# Patient Record
Sex: MALE | Race: WHITE | HISPANIC OR LATINO | URBAN - METROPOLITAN AREA
[De-identification: names, ages, dates, MRNs, and addresses within clinical notes are randomized per-mention and may not be internally consistent; named-entity substitution may affect disease eponyms.]

---

## 2019-02-09 ENCOUNTER — EMERGENCY (EMERGENCY)
Facility: HOSPITAL | Age: 49
LOS: 0 days | Discharge: HOME | End: 2019-02-09
Attending: EMERGENCY MEDICINE
Payer: MEDICAID

## 2019-02-09 VITALS
OXYGEN SATURATION: 97 % | HEART RATE: 92 BPM | WEIGHT: 307.1 LBS | SYSTOLIC BLOOD PRESSURE: 126 MMHG | DIASTOLIC BLOOD PRESSURE: 65 MMHG | RESPIRATION RATE: 17 BRPM | TEMPERATURE: 98 F | HEIGHT: 66 IN

## 2019-02-09 VITALS
SYSTOLIC BLOOD PRESSURE: 124 MMHG | TEMPERATURE: 98 F | OXYGEN SATURATION: 100 % | RESPIRATION RATE: 18 BRPM | HEART RATE: 80 BPM | DIASTOLIC BLOOD PRESSURE: 78 MMHG

## 2019-02-09 DIAGNOSIS — R07.9 CHEST PAIN, UNSPECIFIED: ICD-10-CM

## 2019-02-09 DIAGNOSIS — Z79.4 LONG TERM (CURRENT) USE OF INSULIN: ICD-10-CM

## 2019-02-09 DIAGNOSIS — R50.9 FEVER, UNSPECIFIED: ICD-10-CM

## 2019-02-09 DIAGNOSIS — J18.9 PNEUMONIA, UNSPECIFIED ORGANISM: ICD-10-CM

## 2019-02-09 DIAGNOSIS — F32.9 MAJOR DEPRESSIVE DISORDER, SINGLE EPISODE, UNSPECIFIED: ICD-10-CM

## 2019-02-09 DIAGNOSIS — J67.7: ICD-10-CM

## 2019-02-09 DIAGNOSIS — I10 ESSENTIAL (PRIMARY) HYPERTENSION: ICD-10-CM

## 2019-02-09 DIAGNOSIS — Z79.82 LONG TERM (CURRENT) USE OF ASPIRIN: ICD-10-CM

## 2019-02-09 DIAGNOSIS — R05 COUGH: ICD-10-CM

## 2019-02-09 DIAGNOSIS — E11.9 TYPE 2 DIABETES MELLITUS WITHOUT COMPLICATIONS: ICD-10-CM

## 2019-02-09 LAB
ALBUMIN SERPL ELPH-MCNC: 2.8 G/DL — LOW (ref 3.3–5)
ALP SERPL-CCNC: 76 U/L — SIGNIFICANT CHANGE UP (ref 40–120)
ALT FLD-CCNC: 15 U/L — SIGNIFICANT CHANGE UP (ref 12–78)
ANION GAP SERPL CALC-SCNC: 8 MMOL/L — SIGNIFICANT CHANGE UP (ref 5–17)
APTT BLD: 33.3 SEC — SIGNIFICANT CHANGE UP (ref 27.5–36.3)
AST SERPL-CCNC: 8 U/L — LOW (ref 15–37)
BILIRUB SERPL-MCNC: 0.5 MG/DL — SIGNIFICANT CHANGE UP (ref 0.2–1.2)
BUN SERPL-MCNC: 11 MG/DL — SIGNIFICANT CHANGE UP (ref 7–23)
CALCIUM SERPL-MCNC: 8.5 MG/DL — SIGNIFICANT CHANGE UP (ref 8.5–10.1)
CHLORIDE SERPL-SCNC: 105 MMOL/L — SIGNIFICANT CHANGE UP (ref 96–108)
CK MB CFR SERPL CALC: 3.2 NG/ML — SIGNIFICANT CHANGE UP (ref 0.5–3.6)
CO2 SERPL-SCNC: 28 MMOL/L — SIGNIFICANT CHANGE UP (ref 22–31)
CREAT SERPL-MCNC: 0.62 MG/DL — SIGNIFICANT CHANGE UP (ref 0.5–1.3)
GLUCOSE SERPL-MCNC: 204 MG/DL — HIGH (ref 70–99)
HCT VFR BLD CALC: 40.4 % — SIGNIFICANT CHANGE UP (ref 39–50)
HGB BLD-MCNC: 13.5 G/DL — SIGNIFICANT CHANGE UP (ref 13–17)
INR BLD: 1.01 RATIO — SIGNIFICANT CHANGE UP (ref 0.88–1.16)
MCHC RBC-ENTMCNC: 27.3 PG — SIGNIFICANT CHANGE UP (ref 27–34)
MCHC RBC-ENTMCNC: 33.4 GM/DL — SIGNIFICANT CHANGE UP (ref 32–36)
MCV RBC AUTO: 81.8 FL — SIGNIFICANT CHANGE UP (ref 80–100)
NRBC # BLD: 0 /100 WBCS — SIGNIFICANT CHANGE UP (ref 0–0)
PLATELET # BLD AUTO: 258 K/UL — SIGNIFICANT CHANGE UP (ref 150–400)
POTASSIUM SERPL-MCNC: 4 MMOL/L — SIGNIFICANT CHANGE UP (ref 3.5–5.3)
POTASSIUM SERPL-SCNC: 4 MMOL/L — SIGNIFICANT CHANGE UP (ref 3.5–5.3)
PROT SERPL-MCNC: 7.7 GM/DL — SIGNIFICANT CHANGE UP (ref 6–8.3)
PROTHROM AB SERPL-ACNC: 11.3 SEC — SIGNIFICANT CHANGE UP (ref 10–12.9)
RBC # BLD: 4.94 M/UL — SIGNIFICANT CHANGE UP (ref 4.2–5.8)
RBC # FLD: 13.5 % — SIGNIFICANT CHANGE UP (ref 10.3–14.5)
SODIUM SERPL-SCNC: 141 MMOL/L — SIGNIFICANT CHANGE UP (ref 135–145)
TROPONIN I SERPL-MCNC: <.015 NG/ML — SIGNIFICANT CHANGE UP (ref 0.01–0.04)
WBC # BLD: 6.16 K/UL — SIGNIFICANT CHANGE UP (ref 3.8–10.5)
WBC # FLD AUTO: 6.16 K/UL — SIGNIFICANT CHANGE UP (ref 3.8–10.5)

## 2019-02-09 PROCEDURE — 71045 X-RAY EXAM CHEST 1 VIEW: CPT | Mod: 26

## 2019-02-09 PROCEDURE — 99285 EMERGENCY DEPT VISIT HI MDM: CPT

## 2019-02-09 RX ORDER — ATORVASTATIN CALCIUM 80 MG/1
1 TABLET, FILM COATED ORAL
Qty: 0 | Refills: 0 | COMMUNITY

## 2019-02-09 RX ORDER — ACETAMINOPHEN 500 MG
975 TABLET ORAL ONCE
Qty: 0 | Refills: 0 | Status: COMPLETED | OUTPATIENT
Start: 2019-02-09 | End: 2019-02-09

## 2019-02-09 RX ORDER — FLUOXETINE HCL 10 MG
1 CAPSULE ORAL
Qty: 0 | Refills: 0 | COMMUNITY

## 2019-02-09 RX ORDER — AZITHROMYCIN 500 MG/1
500 TABLET, FILM COATED ORAL ONCE
Qty: 0 | Refills: 0 | Status: COMPLETED | OUTPATIENT
Start: 2019-02-09 | End: 2019-02-09

## 2019-02-09 RX ORDER — AZITHROMYCIN 500 MG/1
1 TABLET, FILM COATED ORAL
Qty: 4 | Refills: 0
Start: 2019-02-09 | End: 2019-02-12

## 2019-02-09 RX ORDER — SERTRALINE 25 MG/1
1 TABLET, FILM COATED ORAL
Qty: 0 | Refills: 0 | COMMUNITY

## 2019-02-09 RX ORDER — LISINOPRIL 2.5 MG/1
1 TABLET ORAL
Qty: 0 | Refills: 0 | COMMUNITY

## 2019-02-09 RX ORDER — ACETAMINOPHEN 500 MG
0 TABLET ORAL
Qty: 0 | Refills: 0 | COMMUNITY

## 2019-02-09 RX ADMIN — AZITHROMYCIN 500 MILLIGRAM(S): 500 TABLET, FILM COATED ORAL at 17:56

## 2019-02-09 RX ADMIN — Medication 975 MILLIGRAM(S): at 15:56

## 2019-02-09 NOTE — ED PROVIDER NOTE - OBJECTIVE STATEMENT
49 yo M with chest pain, cough for over a week.  Pt. went to [facility] recently, was diagnosed with pneumonitis and was discharged.  pt. complains of chronic cough, worse at night.  He has intermittent fever and is worried about pneumonia.  When cough gets bad, pt. coughs up blood.  No other complaints or inciting event.  Pt. otherwise feels well.   ROS: negative for headache, chest pain, shortness of breath, abd pain, nausea, vomiting, diarrhea, rash, paresthesia, and weakness--all other systems reviewed are negative.   PMH: IDDM, HTN, depression, HLD, pneumonitis; Meds: see EMR for list; SH: Denies smoking/drinking/drug use

## 2019-02-09 NOTE — ED ADULT NURSE NOTE - GASTROINTESTINAL ASSESSMENT
DR. JAMISON'S CLINIC LOCATIONS     [facility]   [address] [place] [id number] [address] [place] [address]   [phone] / [phone] / [phone]       THURSDAY - [place] SCHEDULE SURGERY: [phone]   [address] APPOINTMENTS: [phone]   [place] [address] BILLING QUESTIONS: [phone] / [phone]       PRICE THERAPY    Many aches and pains throughout the foot and ankle can be helped with many simple treatments. This is usually described as PRICE Therapy.      P - Protection - often times, inflammation/pain in the lower extremity is not able to improve simply because the areas involved are never allowed to rest. Every step we take can bother the problematic area. Protecting those areas is an important step in the healing process. This may involve a walking cast boot, a special insert/orthotic device, an ankle brace, or simply avoiding barefoot walking.    R - Rest - in addition to protecting the foot/ankle, resting is an important, but often times difficult, treatment option. Getting off your feet when they bother you, and specifically avoiding activities that cause pain/discomfort, are very beneficial to prevent, and treat, foot/ankle pain.      I - Ice - icing regularly can help to decrease inflammation and swelling in the foot, thus decreasing pain. Using an ice pack or a bag of frozen veggies works very well. Ice for 20 minutes multiple times per day as needed.  Do not place the ice directly on the skin as this can cause tissue damage.    C - Compression - using a compression wrap or an ACE wrap can help to decrease swelling, which can help to decrease pain. Wearing the wraps is generally not needed at night, but they should be worn on a regular basis when you are going to be on your feet for prolonged periods as gravity tends to pull fluids down to your feet/ankles.    E - Elevation -  elevating your lower extremities multiple times daily for 15-20 minutes can help to decrease swelling, which works well in decreasing pain levels.    NSAID/Tylenol - Anti-inflammatories like Aleve or ibuprofen, and/or a pain medication, such as Tylenol, can help to improve pain levels and get the issue resolved sooner rather than later. Anyone with liver issues should be careful with Tylenol, and anyone with high blood pressure or heart, stomach or kidney issues should be careful with anti-inflammatories. Please ask if you have questions about these medications, including dosage.      TOE & METATARSAL FRACTURES  The structure of the foot is complex, consisting of bones, muscles, tendons, and other soft tissues. Of the 26 bones in the foot, 19 are toe bones (phalanges) and metatarsal bones (the long bones in the midfoot). Fractures of the toe and metatarsal bones are common and require evaluation by a specialist. A foot and ankle surgeon should be seen for proper diagnosis and treatment, even if initial treatment has been received in an emergency room.  A fracture is a break in the bone. Fractures can be divided into two categories: traumatic fractures and stress fractures.  TRAMATIC FRACTURES (also called acute fractures) are caused by a direct blow or impact, such as seriously stubbing your toe. Traumatic fractures can be displaced or non-displaced. If the fracture is displaced, the bone is broken in such a way that it has changed in position (dislocated).  Signs and symptoms of a traumatic fracture include:  You may hear a sound at the time of the break.    Pinpoint pain  (pain at the place of impact) at the time the fracture occurs and perhaps for a few hours later, but often the pain goes away after several hours.   Crooked or abnormal appearance of the toe.   Bruising and swelling the next day.   It is not true that  if you can walk on it, it s not broken.  Evaluation by a foot and ankle surgeon is always  recommended.   STRESS FRACTURES are tiny, hairline breaks that are usually caused by repetitive stress. Stress fractures often afflict athletes who, for example, too rapidly increase their running mileage. They can also be caused by an abnormal foot structure, deformities, or osteoporosis. Improper footwear may also lead to stress fractures. Stress fractures should not be ignored. They require proper medical attention to heal correctly.  Symptoms of stress fractures include:  Pain with or after normal activity   Pain that goes away when resting and then returns when standing or during activity    Pinpoint pain  (pain at the site of the fracture) when touched   Swelling, but no bruising   IMPROPER TREATMENT  Some people say that  the doctor can t do anything for a broken bone in the foot.  This is usually not true. In fact, if a fractured toe or metatarsal bone is not treated correctly, serious complications may develop. For example:  A deformity in the bony architecture which may limit the ability to move the foot or cause difficulty in fitting shoes   Arthritis, which may be caused by a fracture in a joint (the juncture where two bones meet), or may be a result of angular deformities that develop when a displaced fracture is severe or hasn t been properly corrected   Chronic pain and deformity   Non-union, or failure to heal, can lead to subsequent surgery or chronic pain.   PROPER TREATMENT FOR TOES  Fractures of the toe bones are almost always traumatic fractures. Treatment for traumatic fractures depends on the break itself and may include these options:  Rest. Sometimes rest is all that is needed to treat a traumatic fracture of the toe.   Splinting. The toe may be fitted with a splint to keep it in a fixed position.   Rigid or stiff-soled shoe. Wearing a stiff-soled shoe protects the toe and helps keep it properly positioned.    Cesar taping  the fractured toe to another toe is sometimes appropriate, but in  other cases it may be harmful.   Surgery. If the break is badly displaced or if the joint is affected, surgery may be necessary. Surgery often involves the use of fixation devices, such as pins.   PROPER TREATMENT OF METATARSALS  Breaks in the metatarsal bones may be either stress or traumatic fractures. Certain kinds of fractures of the metatarsal bones present unique challenges.  For example, sometimes a fracture of the first metatarsal bone (behind the big toe) can lead to arthritis. Since the big toe is used so frequently and bears more weight than other toes, arthritis in that area can make it painful to walk, bend, or even stand.  Another type of break, called a Cruz fracture, occurs at the base of the fifth metatarsal bone (behind the little toe). It is often misdiagnosed as an ankle sprain, and misdiagnosis can have serious consequences since sprains and fractures require different treatments. Your foot and ankle surgeon is an expert in correctly identifying these conditions as well as other problems of the foot.  Treatment of metatarsal fractures depends on the type and extent of the fracture, and may include:  Rest. Sometimes rest is the only treatment needed to promote healing of a stress or traumatic fracture of a metatarsal bone.   Avoid the offending activity. Because stress fractures result from repetitive stress, it is important to avoid the activity that led to the fracture. Crutches or a wheelchair are sometimes required to offload weight from the foot to give it time to heal.   Immobilization, casting, or rigid shoe. A stiff-soled shoe or other form of immobilization may be used to protect the fractured bone while it is healing.   Surgery. Some traumatic fractures of the metatarsal bones require surgery, especially if the break is badly displaced.   Follow-up care. Your foot and ankle surgeon will provide instructions for care following surgical or non-surgical treatment. Physical therapy,  exercises and rehabilitation may be included in a schedule for return to normal activities.       _______________________________________________________________________    Send a friend or family member to see Dr. Peter and recieve a [employer] discount. Just have them mention your name at their appointment and we will mail you the discount information.   _______________________________________________________________________    BODY MASS INDEX (BMI)  Many things can cause foot and ankle problems. Foot structure, activity level, foot mechanics and injuries are common causes of pain.  One very important issue that often goes unmentioned, is body weight.  Extra weight can cause increased stress on muscles, ligaments, bones and tendons.  Sometimes just a few extra pounds is all it takes to put one over her/his threshold. Without reducing that stress, it can be difficult to alleviate pain. Some people are uncomfortable addressing this issue, but we feel it is important for you to think about it. As Foot &  Ankle specialists, our job is addressing the lower extremity problem and possible causes. Regarding extra body weight, we encourage patients to discuss diet and weight management plans with their primary care doctors. It is this team approach that gives you the best opportunity for pain relief and getting you back on your feet.         WDL

## 2019-02-09 NOTE — ED ADULT TRIAGE NOTE - CHIEF COMPLAINT QUOTE
pt states " for the past 2 weeks chest pain, cough, sore throat." htn,dm,hdl. pt states " for the past 2 weeks chest pain, cough, sore throat." htn ,dm, hdl. d/c from [facility] for inflammation of lining of lung, viral sore throat. pt had no medication for home.

## 2019-02-09 NOTE — ED PROVIDER NOTE - PHYSICAL EXAMINATION
Vitals: WNL  Gen: AAOx3, NAD, sitting comfortably in stretcher, non-toxic, cooperative, with pleasant demeanor   Head: ncat, perrla, eomi b/l  Neck: supple, no lymphadenopathy, no midline deviation  Heart: rrr, no m/r/g  Lungs: CTA b/l, no rales/ronchi/wheezes  Abd: soft, nontender, non-distended, no rebound or guarding  Ext: no clubbing/cyanosis/edema  Neuro: sensation and muscle strength intact b/l, steady gait

## 2019-02-09 NOTE — ED ADULT NURSE NOTE - CHIEF COMPLAINT QUOTE
pt states " for the past 2 weeks chest pain, cough, sore throat." htn ,dm, hdl. d/c from [facility] for inflammation of lining of lung, viral sore throat. pt had no medication for home.

## 2019-02-09 NOTE — ED PROVIDER NOTE - MEDICAL DECISION MAKING DETAILS
47 yo M with CAP  -xr, labs, monitor, trop, ckmb, ekg, f/u all results, reeval, likely d/c with pulm f/u

## 2019-02-09 NOTE — ED PROVIDER NOTE - NSFOLLOWUPCLINICS_GEN_ALL_ED_FT
[facility] Pulmonolgy and Sleep Medicine  Pulmonology  [address] [place] [address], [facility] [address]  [place] [address]  Phone: [phone]  Fax:   Follow Up Time: 4-6 Days

## 2020-06-04 NOTE — ED ADULT NURSE NOTE - CADM POA URETHRAL CATHETER
[Normal] : normal appearance, no rash, nodules, vesicles, ulcers, erythema [de-identified] : Awake, Alert, Interactive [de-identified] : Multiple dental caps [de-identified] : no visualized deficits [de-identified] : supple [de-identified] : no visualized distress No

## 2021-08-03 ENCOUNTER — INPATIENT (INPATIENT)
Facility: HOSPITAL | Age: 51
LOS: 5 days | Discharge: HOME | DRG: 638 | End: 2021-08-09
Attending: INTERNAL MEDICINE | Admitting: INTERNAL MEDICINE
Payer: MEDICARE

## 2021-08-03 VITALS
HEIGHT: 66 IN | WEIGHT: 313.94 LBS | SYSTOLIC BLOOD PRESSURE: 129 MMHG | RESPIRATION RATE: 20 BRPM | OXYGEN SATURATION: 97 % | HEART RATE: 103 BPM | TEMPERATURE: 98 F | DIASTOLIC BLOOD PRESSURE: 76 MMHG

## 2021-08-03 DIAGNOSIS — L03.90 CELLULITIS, UNSPECIFIED: ICD-10-CM

## 2021-08-03 DIAGNOSIS — Z89.419 ACQUIRED ABSENCE OF UNSPECIFIED GREAT TOE: Chronic | ICD-10-CM

## 2021-08-03 LAB
ALBUMIN SERPL ELPH-MCNC: 3.9 G/DL — SIGNIFICANT CHANGE UP (ref 3.3–5)
ALP SERPL-CCNC: 73 U/L — SIGNIFICANT CHANGE UP (ref 40–120)
ALT FLD-CCNC: 21 U/L — SIGNIFICANT CHANGE UP (ref 10–45)
ANION GAP SERPL CALC-SCNC: 11 MMOL/L — SIGNIFICANT CHANGE UP (ref 5–17)
ANION GAP SERPL CALC-SCNC: 11 MMOL/L — SIGNIFICANT CHANGE UP (ref 5–17)
AST SERPL-CCNC: 49 U/L — HIGH (ref 10–40)
BASE EXCESS BLDV CALC-SCNC: 3.6 MMOL/L — HIGH (ref -2–2)
BASOPHILS # BLD AUTO: 0.02 K/UL — SIGNIFICANT CHANGE UP (ref 0–0.2)
BASOPHILS NFR BLD AUTO: 0.3 % — SIGNIFICANT CHANGE UP (ref 0–2)
BILIRUB SERPL-MCNC: 0.7 MG/DL — SIGNIFICANT CHANGE UP (ref 0.2–1.2)
BUN SERPL-MCNC: 14 MG/DL — SIGNIFICANT CHANGE UP (ref 7–23)
BUN SERPL-MCNC: 15 MG/DL — SIGNIFICANT CHANGE UP (ref 7–23)
CA-I SERPL-SCNC: 1.19 MMOL/L — SIGNIFICANT CHANGE UP (ref 1.12–1.3)
CALCIUM SERPL-MCNC: 9.4 MG/DL — SIGNIFICANT CHANGE UP (ref 8.4–10.5)
CALCIUM SERPL-MCNC: 9.6 MG/DL — SIGNIFICANT CHANGE UP (ref 8.4–10.5)
CHLORIDE BLDV-SCNC: 102 MMOL/L — SIGNIFICANT CHANGE UP (ref 96–108)
CHLORIDE SERPL-SCNC: 96 MMOL/L — SIGNIFICANT CHANGE UP (ref 96–108)
CHLORIDE SERPL-SCNC: 98 MMOL/L — SIGNIFICANT CHANGE UP (ref 96–108)
CO2 BLDV-SCNC: 32 MMOL/L — HIGH (ref 22–30)
CO2 SERPL-SCNC: 25 MMOL/L — SIGNIFICANT CHANGE UP (ref 22–31)
CO2 SERPL-SCNC: 25 MMOL/L — SIGNIFICANT CHANGE UP (ref 22–31)
CREAT SERPL-MCNC: 0.48 MG/DL — LOW (ref 0.5–1.3)
CREAT SERPL-MCNC: 0.52 MG/DL — SIGNIFICANT CHANGE UP (ref 0.5–1.3)
CRP SERPL-MCNC: 20 MG/L — HIGH (ref 0–4)
EOSINOPHIL # BLD AUTO: 0.1 K/UL — SIGNIFICANT CHANGE UP (ref 0–0.5)
EOSINOPHIL NFR BLD AUTO: 1.7 % — SIGNIFICANT CHANGE UP (ref 0–6)
ERYTHROCYTE [SEDIMENTATION RATE] IN BLOOD: 62 MM/HR — HIGH (ref 0–20)
GAS PNL BLDV: 132 MMOL/L — LOW (ref 135–145)
GAS PNL BLDV: SIGNIFICANT CHANGE UP
GAS PNL BLDV: SIGNIFICANT CHANGE UP
GLUCOSE BLDC GLUCOMTR-MCNC: 314 MG/DL — HIGH (ref 70–99)
GLUCOSE BLDV-MCNC: 360 MG/DL — HIGH (ref 70–99)
GLUCOSE SERPL-MCNC: 355 MG/DL — HIGH (ref 70–99)
GLUCOSE SERPL-MCNC: 394 MG/DL — HIGH (ref 70–99)
HCO3 BLDV-SCNC: 30 MMOL/L — HIGH (ref 21–29)
HCT VFR BLD CALC: 40.1 % — SIGNIFICANT CHANGE UP (ref 39–50)
HCT VFR BLDA CALC: 54 % — HIGH (ref 39–50)
HGB BLD CALC-MCNC: 17.7 G/DL — HIGH (ref 13–17)
HGB BLD-MCNC: 13.5 G/DL — SIGNIFICANT CHANGE UP (ref 13–17)
IMM GRANULOCYTES NFR BLD AUTO: 0.7 % — SIGNIFICANT CHANGE UP (ref 0–1.5)
LACTATE BLDV-MCNC: 1.8 MMOL/L — SIGNIFICANT CHANGE UP (ref 0.7–2)
LYMPHOCYTES # BLD AUTO: 1.27 K/UL — SIGNIFICANT CHANGE UP (ref 1–3.3)
LYMPHOCYTES # BLD AUTO: 21.4 % — SIGNIFICANT CHANGE UP (ref 13–44)
MCHC RBC-ENTMCNC: 28 PG — SIGNIFICANT CHANGE UP (ref 27–34)
MCHC RBC-ENTMCNC: 33.7 GM/DL — SIGNIFICANT CHANGE UP (ref 32–36)
MCV RBC AUTO: 83 FL — SIGNIFICANT CHANGE UP (ref 80–100)
MONOCYTES # BLD AUTO: 0.31 K/UL — SIGNIFICANT CHANGE UP (ref 0–0.9)
MONOCYTES NFR BLD AUTO: 5.2 % — SIGNIFICANT CHANGE UP (ref 2–14)
NEUTROPHILS # BLD AUTO: 4.2 K/UL — SIGNIFICANT CHANGE UP (ref 1.8–7.4)
NEUTROPHILS NFR BLD AUTO: 70.7 % — SIGNIFICANT CHANGE UP (ref 43–77)
NRBC # BLD: 0 /100 WBCS — SIGNIFICANT CHANGE UP (ref 0–0)
PCO2 BLDV: 55 MMHG — HIGH (ref 35–50)
PH BLDV: 7.36 — SIGNIFICANT CHANGE UP (ref 7.35–7.45)
PLATELET # BLD AUTO: 207 K/UL — SIGNIFICANT CHANGE UP (ref 150–400)
PO2 BLDV: 35 MMHG — SIGNIFICANT CHANGE UP (ref 25–45)
POTASSIUM BLDV-SCNC: 4.3 MMOL/L — SIGNIFICANT CHANGE UP (ref 3.5–5.3)
POTASSIUM SERPL-MCNC: 4.4 MMOL/L — SIGNIFICANT CHANGE UP (ref 3.5–5.3)
POTASSIUM SERPL-MCNC: 6.5 MMOL/L — CRITICAL HIGH (ref 3.5–5.3)
POTASSIUM SERPL-MCNC: 6.5 MMOL/L — CRITICAL HIGH (ref 3.5–5.3)
POTASSIUM SERPL-SCNC: 4.4 MMOL/L — SIGNIFICANT CHANGE UP (ref 3.5–5.3)
POTASSIUM SERPL-SCNC: 6.5 MMOL/L — CRITICAL HIGH (ref 3.5–5.3)
POTASSIUM SERPL-SCNC: 6.5 MMOL/L — CRITICAL HIGH (ref 3.5–5.3)
PROT SERPL-MCNC: 7.8 G/DL — SIGNIFICANT CHANGE UP (ref 6–8.3)
RBC # BLD: 4.83 M/UL — SIGNIFICANT CHANGE UP (ref 4.2–5.8)
RBC # FLD: 14.6 % — HIGH (ref 10.3–14.5)
SAO2 % BLDV: 58 % — LOW (ref 67–88)
SARS-COV-2 RNA SPEC QL NAA+PROBE: SIGNIFICANT CHANGE UP
SODIUM SERPL-SCNC: 132 MMOL/L — LOW (ref 135–145)
SODIUM SERPL-SCNC: 134 MMOL/L — LOW (ref 135–145)
WBC # BLD: 5.94 K/UL — SIGNIFICANT CHANGE UP (ref 3.8–10.5)
WBC # FLD AUTO: 5.94 K/UL — SIGNIFICANT CHANGE UP (ref 3.8–10.5)

## 2021-08-03 PROCEDURE — 93971 EXTREMITY STUDY: CPT | Mod: 26,LT

## 2021-08-03 PROCEDURE — 99284 EMERGENCY DEPT VISIT MOD MDM: CPT

## 2021-08-03 PROCEDURE — 73630 X-RAY EXAM OF FOOT: CPT | Mod: 26,LT

## 2021-08-03 RX ORDER — ZINC SULFATE TAB 220 MG (50 MG ZINC EQUIVALENT) 220 (50 ZN) MG
220 TAB ORAL DAILY
Refills: 0 | Status: DISCONTINUED | OUTPATIENT
Start: 2021-08-03 | End: 2021-08-09

## 2021-08-03 RX ORDER — DEXTROSE 50 % IN WATER 50 %
25 SYRINGE (ML) INTRAVENOUS ONCE
Refills: 0 | Status: DISCONTINUED | OUTPATIENT
Start: 2021-08-03 | End: 2021-08-09

## 2021-08-03 RX ORDER — DEXTROSE 50 % IN WATER 50 %
15 SYRINGE (ML) INTRAVENOUS ONCE
Refills: 0 | Status: DISCONTINUED | OUTPATIENT
Start: 2021-08-03 | End: 2021-08-09

## 2021-08-03 RX ORDER — SODIUM CHLORIDE 9 MG/ML
1000 INJECTION, SOLUTION INTRAVENOUS
Refills: 0 | Status: DISCONTINUED | OUTPATIENT
Start: 2021-08-03 | End: 2021-08-09

## 2021-08-03 RX ORDER — LISINOPRIL 2.5 MG/1
10 TABLET ORAL DAILY
Refills: 0 | Status: DISCONTINUED | OUTPATIENT
Start: 2021-08-03 | End: 2021-08-09

## 2021-08-03 RX ORDER — INSULIN LISPRO 100/ML
VIAL (ML) SUBCUTANEOUS
Refills: 0 | Status: DISCONTINUED | OUTPATIENT
Start: 2021-08-03 | End: 2021-08-09

## 2021-08-03 RX ORDER — INSULIN GLARGINE 100 [IU]/ML
10 INJECTION, SOLUTION SUBCUTANEOUS AT BEDTIME
Refills: 0 | Status: DISCONTINUED | OUTPATIENT
Start: 2021-08-03 | End: 2021-08-05

## 2021-08-03 RX ORDER — ASPIRIN/CALCIUM CARB/MAGNESIUM 324 MG
81 TABLET ORAL DAILY
Refills: 0 | Status: DISCONTINUED | OUTPATIENT
Start: 2021-08-03 | End: 2021-08-09

## 2021-08-03 RX ORDER — HEPARIN SODIUM 5000 [USP'U]/ML
5000 INJECTION INTRAVENOUS; SUBCUTANEOUS EVERY 8 HOURS
Refills: 0 | Status: DISCONTINUED | OUTPATIENT
Start: 2021-08-03 | End: 2021-08-09

## 2021-08-03 RX ORDER — VANCOMYCIN HCL 1 G
1500 VIAL (EA) INTRAVENOUS ONCE
Refills: 0 | Status: COMPLETED | OUTPATIENT
Start: 2021-08-03 | End: 2021-08-03

## 2021-08-03 RX ORDER — GLUCAGON INJECTION, SOLUTION 0.5 MG/.1ML
1 INJECTION, SOLUTION SUBCUTANEOUS ONCE
Refills: 0 | Status: DISCONTINUED | OUTPATIENT
Start: 2021-08-03 | End: 2021-08-09

## 2021-08-03 RX ORDER — DEXTROSE 50 % IN WATER 50 %
12.5 SYRINGE (ML) INTRAVENOUS ONCE
Refills: 0 | Status: DISCONTINUED | OUTPATIENT
Start: 2021-08-03 | End: 2021-08-09

## 2021-08-03 RX ORDER — INSULIN LISPRO 100/ML
VIAL (ML) SUBCUTANEOUS AT BEDTIME
Refills: 0 | Status: DISCONTINUED | OUTPATIENT
Start: 2021-08-03 | End: 2021-08-09

## 2021-08-03 RX ADMIN — HEPARIN SODIUM 5000 UNIT(S): 5000 INJECTION INTRAVENOUS; SUBCUTANEOUS at 22:26

## 2021-08-03 RX ADMIN — INSULIN GLARGINE 10 UNIT(S): 100 INJECTION, SOLUTION SUBCUTANEOUS at 22:28

## 2021-08-03 RX ADMIN — Medication 2: at 22:30

## 2021-08-03 RX ADMIN — Medication 500 MILLIGRAM(S): at 16:30

## 2021-08-03 NOTE — ED PROVIDER NOTE - PHYSICAL EXAMINATION
Gen: well developed male NAD   HEENT: NCAT, EOMI, no nasal discharge, mucous membranes moist  CV: RRR, +S1/S2, no M/R/G  Resp: CTAB, no W/R/R  GI: Abdomen soft non-distended, NTTP, no masses  MSK: +wound to L plantar foot no drainage +muscle visible no bone. +erythema and swelling to LLE to mid-ankle +warm to touch compared to R foot. no gross deformity no bony tenderness   Neuro: A&Ox4, following commands, moving all four extremities spontaneously  Psych: appropriate mood

## 2021-08-03 NOTE — ED PROVIDER NOTE - OBJECTIVE STATEMENT
52 y/o M MPH HTN HLD DM on insulin, diabetic foot ulcer s/p multiple debridements and L great toe amputation (NUMC) presents to ED c/o worsening redness, pain, and swelling to L foot and ankle x3 days assoc w/ chills 2 days ago. Denies fevers, increased drainage, bleeding from the wound. Denies recent abx.    PMD: Brock

## 2021-08-03 NOTE — H&P ADULT - HISTORY OF PRESENT ILLNESS
50 y/o M MPH HTN HLD DM on insulin, diabetic foot ulcer s/p multiple debridements and L great toe amputation (NUMC) presents to ED c/o worsening redness, pain, and swelling to L foot and ankle x3 days assoc w/ chills 2 days ago. Denies fevers, increased drainage, bleeding from the wound. Denies recent abx.

## 2021-08-03 NOTE — ED ADULT NURSE NOTE - OBJECTIVE STATEMENT
c/o non-healing wound to left foot plantar aspect for about one month. Patient has been seen by his podiatrist who has been debriding the wound and sending him home, instructing him not to bear weight to left lower extremity. Patient came to ED today due to ongoing pain he is experiencing at the wound and having difficult c/o non-healing diabetic foot wound to left foot plantar aspect for about one month. Patient has been seen by his podiatrist who has been debriding the wound and sending him home, instructing him not to bear weight to left lower extremity. Patient came to ED today due to ongoing pain he is experiencing at the wound and having difficultly ambulating. Denies any fever or chills. Denies any nausea, vomiting, diarrhea or urinary symptoms. Patient uses a can to ambulate. Left great toe amputation noted, well healed. Open wound to left plantar aspect, no purulent drainage.

## 2021-08-03 NOTE — H&P ADULT - ASSESSMENT
50 y/o M MPH HTN HLD DM on insulin, diabetic foot ulcer s/p multiple debridements and L great toe amputation (NUMC) presents to ED c/o worsening redness, pain, and swelling to L foot and ankle x3 days assoc w/ chills 2 days ago. Denies fevers, increased drainage, bleeding from the wound. Denies recent abx.    diabetic foot ulcer  - no evidence of infection  - r/o ostyeo  - esr. crp  - pdiatry consult    htn  - c/w home meds    diabetes  - fs qid  - hgb a1c  - insulin ss    dvt px  - heparin

## 2021-08-03 NOTE — H&P ADULT - NSCORESITESY/N_GEN_A_CORE_RD
Notification Instructions: Patient will be notified of biopsy results. However, patient instructed to call the office if not contacted within 2 weeks. Post-Care Instructions: I reviewed with the patient in detail post-care instructions. Patient is to keep the biopsy site dry overnight, and then apply Aquaphor Ointment twice daily until healed. Hemostasis: Electrocautery Silver Nitrate Text: The wound bed was treated with silver nitrate after the biopsy was performed. Additional Anesthesia Volume In Cc (Will Not Render If 0): 0 Anesthesia Volume In Cc (Will Not Render If 0): 0.3 Biopsy Type: H and E Bill [id number] For Specimen Handling/Conveyance To Laboratory?: no Dressing: bandage Body Location Override (Optional - Billing Will Still Be Based On Selected Body Map Location If Applicable): Left post-auricular neck Lab: [id number] Wound Care: Aquaphor Anesthesia Type: 1% lidocaine with 1:100,000 epinephrine Render Post-Care Instructions In Note?: yes Size Of Lesion In Cm: 0.4 Consent: Verbal consent was obtained and risks were reviewed including but not limited to scarring, infection, bleeding, scabbing, incomplete removal, nerve damage and allergy to anesthesia. Electrodesiccation And Curettage Text: The wound bed was treated with electrodesiccation and curettage after the biopsy was performed. Detail Level: Simple Path Notes (To The Dermatopathologist): Check margins Body Location Override (Optional - Billing Will Still Be Based On Selected Body Map Location If Applicable): Left upper back Lab: [id number] Cryotherapy Text: The wound bed was treated with cryotherapy after the biopsy was performed. Type Of Destruction Used: Curettage No Electrodesiccation Text: The wound bed was treated with electrodesiccation after the biopsy was performed. Lab Facility: [id number] Biopsy Method: Dermablade Billing Type: Third-Party Bill Lab Facility: [id number] Billing Type: Third-Party Bill

## 2021-08-03 NOTE — H&P ADULT - GENITOURINARY
[General Appearance - Alert] : alert [General Appearance - In No Acute Distress] : in no acute distress [Sclera] : the sclera and conjunctiva were normal [PERRL With Normal Accommodation] : pupils were equal in size, round, and reactive to light [Extraocular Movements] : extraocular movements were intact [Outer Ear] : the ears and nose were normal in appearance [Oropharynx] : the oropharynx was normal [Neck Appearance] : the appearance of the neck was normal [Neck Cervical Mass (___cm)] : no neck mass was observed [Jugular Venous Distention Increased] : there was no jugular-venous distention [Thyroid Diffuse Enlargement] : the thyroid was not enlarged [Thyroid Nodule] : there were no palpable thyroid nodules [Auscultation Breath Sounds / Voice Sounds] : lungs were clear to auscultation bilaterally [FreeTextEntry1] : slight LLQ abdominal tenderness and left lateral tenderness. No masses or rebound [No CVA Tenderness] : no ~M costovertebral angle tenderness [No Spinal Tenderness] : no spinal tenderness [Abnormal Walk] : normal gait [Nail Clubbing] : no clubbing  or cyanosis of the fingernails [Musculoskeletal - Swelling] : no joint swelling seen [Motor Tone] : muscle strength and tone were normal [Skin Color & Pigmentation] : normal skin color and pigmentation [Skin Turgor] : normal skin turgor [] : no rash [Deep Tendon Reflexes (DTR)] : deep tendon reflexes were 2+ and symmetric [Sensation] : the sensory exam was normal to light touch and pinprick [No Focal Deficits] : no focal deficits [Oriented To Time, Place, And Person] : oriented to person, place, and time [Impaired Insight] : insight and judgment were intact [Affect] : the affect was normal negative

## 2021-08-03 NOTE — H&P ADULT - NSHPLABSRESULTS_GEN_ALL_CORE
LABS:                        13.5   5.94  )-----------( 207      ( 03 Aug 2021 13:53 )             40.1     08-03    x   |  x   |  x   ----------------------------<  x   4.4   |  x   |  x     Ca    9.4      03 Aug 2021 14:56    TPro  7.8  /  Alb  3.9  /  TBili  0.7  /  DBili  x   /  AST  49<H>  /  ALT  21  /  AlkPhos  73  08-03              RADIOLOGY & ADDITIONAL TESTS:

## 2021-08-03 NOTE — ED PROVIDER NOTE - CLINICAL SUMMARY MEDICAL DECISION MAKING FREE TEXT BOX
52 y/o M MPH HTN HLD DM on insulin, diabetic foot ulcer s/p multiple debridements and L great toe amputation (NUMC) presents to ED c/o worsening redness, pain, and swelling to L foot and ankle x3 days assoc w/ chills 2 days ago. concern for cellulitis, osteomyelitis, diabetic foot wound, plan labs XR abx likely admit 52 y/o M MPH HTN HLD DM on insulin, diabetic foot ulcer s/p multiple debridements and L great toe amputation (NUMC) presents to ED c/o worsening redness, pain, and swelling to L foot and ankle x3 days assoc w/ chills 2 days ago. concern for cellulitis, osteomyelitis, diabetic foot wound, plan labs XR abx likely admit    isabela: 51 year old male with DM foot ulcer s/p multiple debriedments, here with worsening redness/pain and swelling to left foot and ankle x 3 days. associated with chills 2 days ago. cocnerning for cellulitis. will get xray, pain control, iv abx, admit.

## 2021-08-03 NOTE — ED ADULT NURSE REASSESSMENT NOTE - NS ED NURSE REASSESS COMMENT FT1
Report received from MYRNA Cisneros. Pt AAOx4, NAD, resp nonlabored, skin warm/dry, diabetic ulcers noted to feet, resting comfortably in bed with call bell at bedside and abx funning well on pump. Pt denies headache, dizziness, chest pain, palpitations, SOB, abd pain, n/v/d, urinary symptoms, fevers, chills, weakness at this time. Pt awaiting bed. Safety maintained.

## 2021-08-04 LAB
A1C WITH ESTIMATED AVERAGE GLUCOSE RESULT: 12.6 % — HIGH (ref 4–5.6)
ANION GAP SERPL CALC-SCNC: 12 MMOL/L — SIGNIFICANT CHANGE UP (ref 5–17)
BUN SERPL-MCNC: 14 MG/DL — SIGNIFICANT CHANGE UP (ref 7–23)
CALCIUM SERPL-MCNC: 9.7 MG/DL — SIGNIFICANT CHANGE UP (ref 8.4–10.5)
CHLORIDE SERPL-SCNC: 97 MMOL/L — SIGNIFICANT CHANGE UP (ref 96–108)
CO2 SERPL-SCNC: 24 MMOL/L — SIGNIFICANT CHANGE UP (ref 22–31)
COVID-19 SPIKE DOMAIN AB INTERP: POSITIVE
COVID-19 SPIKE DOMAIN ANTIBODY RESULT: >250 U/ML — HIGH
CREAT SERPL-MCNC: 0.45 MG/DL — LOW (ref 0.5–1.3)
CRP SERPL-MCNC: 18 MG/L — HIGH (ref 0–4)
ERYTHROCYTE [SEDIMENTATION RATE] IN BLOOD: 54 MM/HR — HIGH (ref 0–20)
ESTIMATED AVERAGE GLUCOSE: 315 MG/DL — HIGH (ref 68–114)
FOLATE SERPL-MCNC: 17.4 NG/ML — SIGNIFICANT CHANGE UP
GLUCOSE BLDC GLUCOMTR-MCNC: 288 MG/DL — HIGH (ref 70–99)
GLUCOSE BLDC GLUCOMTR-MCNC: 337 MG/DL — HIGH (ref 70–99)
GLUCOSE BLDC GLUCOMTR-MCNC: 346 MG/DL — HIGH (ref 70–99)
GLUCOSE BLDC GLUCOMTR-MCNC: 349 MG/DL — HIGH (ref 70–99)
GLUCOSE SERPL-MCNC: 298 MG/DL — HIGH (ref 70–99)
HCT VFR BLD CALC: 43.1 % — SIGNIFICANT CHANGE UP (ref 39–50)
HGB BLD-MCNC: 14.2 G/DL — SIGNIFICANT CHANGE UP (ref 13–17)
MAGNESIUM SERPL-MCNC: 1.8 MG/DL — SIGNIFICANT CHANGE UP (ref 1.6–2.6)
MCHC RBC-ENTMCNC: 28.1 PG — SIGNIFICANT CHANGE UP (ref 27–34)
MCHC RBC-ENTMCNC: 32.9 GM/DL — SIGNIFICANT CHANGE UP (ref 32–36)
MCV RBC AUTO: 85.2 FL — SIGNIFICANT CHANGE UP (ref 80–100)
NRBC # BLD: 0 /100 WBCS — SIGNIFICANT CHANGE UP (ref 0–0)
PHOSPHATE SERPL-MCNC: 3.1 MG/DL — SIGNIFICANT CHANGE UP (ref 2.5–4.5)
PLATELET # BLD AUTO: 209 K/UL — SIGNIFICANT CHANGE UP (ref 150–400)
POTASSIUM SERPL-MCNC: 4.2 MMOL/L — SIGNIFICANT CHANGE UP (ref 3.5–5.3)
POTASSIUM SERPL-SCNC: 4.2 MMOL/L — SIGNIFICANT CHANGE UP (ref 3.5–5.3)
RBC # BLD: 5.06 M/UL — SIGNIFICANT CHANGE UP (ref 4.2–5.8)
RBC # FLD: 14.7 % — HIGH (ref 10.3–14.5)
SARS-COV-2 IGG+IGM SERPL QL IA: >250 U/ML — HIGH
SARS-COV-2 IGG+IGM SERPL QL IA: POSITIVE
SODIUM SERPL-SCNC: 133 MMOL/L — LOW (ref 135–145)
TSH SERPL-MCNC: 4.17 UIU/ML — SIGNIFICANT CHANGE UP (ref 0.27–4.2)
VIT B12 SERPL-MCNC: 733 PG/ML — SIGNIFICANT CHANGE UP (ref 232–1245)
WBC # BLD: 6.39 K/UL — SIGNIFICANT CHANGE UP (ref 3.8–10.5)
WBC # FLD AUTO: 6.39 K/UL — SIGNIFICANT CHANGE UP (ref 3.8–10.5)

## 2021-08-04 RX ORDER — KETOROLAC TROMETHAMINE 30 MG/ML
15 SYRINGE (ML) INJECTION ONCE
Refills: 0 | Status: DISCONTINUED | OUTPATIENT
Start: 2021-08-04 | End: 2021-08-04

## 2021-08-04 RX ORDER — CHOLECALCIFEROL (VITAMIN D3) 125 MCG
1 CAPSULE ORAL
Qty: 0 | Refills: 0 | DISCHARGE

## 2021-08-04 RX ORDER — ASCORBIC ACID 60 MG
1 TABLET,CHEWABLE ORAL
Qty: 0 | Refills: 0 | DISCHARGE

## 2021-08-04 RX ORDER — FOLIC ACID/VIT B COMPLEX AND C 400 MCG
2400 TABLET ORAL
Qty: 0 | Refills: 0 | DISCHARGE

## 2021-08-04 RX ORDER — ASPIRIN/CALCIUM CARB/MAGNESIUM 324 MG
1 TABLET ORAL
Qty: 0 | Refills: 0 | DISCHARGE

## 2021-08-04 RX ORDER — ZINC SULFATE TAB 220 MG (50 MG ZINC EQUIVALENT) 220 (50 ZN) MG
1 TAB ORAL
Qty: 0 | Refills: 0 | DISCHARGE

## 2021-08-04 RX ORDER — MUPIROCIN 20 MG/G
1 OINTMENT TOPICAL
Refills: 0 | Status: DISCONTINUED | OUTPATIENT
Start: 2021-08-04 | End: 2021-08-09

## 2021-08-04 RX ADMIN — HEPARIN SODIUM 5000 UNIT(S): 5000 INJECTION INTRAVENOUS; SUBCUTANEOUS at 05:51

## 2021-08-04 RX ADMIN — HEPARIN SODIUM 5000 UNIT(S): 5000 INJECTION INTRAVENOUS; SUBCUTANEOUS at 13:36

## 2021-08-04 RX ADMIN — ZINC SULFATE TAB 220 MG (50 MG ZINC EQUIVALENT) 220 MILLIGRAM(S): 220 (50 ZN) TAB at 12:06

## 2021-08-04 RX ADMIN — Medication 81 MILLIGRAM(S): at 12:04

## 2021-08-04 RX ADMIN — INSULIN GLARGINE 10 UNIT(S): 100 INJECTION, SOLUTION SUBCUTANEOUS at 22:14

## 2021-08-04 RX ADMIN — LISINOPRIL 10 MILLIGRAM(S): 2.5 TABLET ORAL at 05:51

## 2021-08-04 RX ADMIN — Medication 15 MILLIGRAM(S): at 09:09

## 2021-08-04 RX ADMIN — Medication 1 TABLET(S): at 12:05

## 2021-08-04 RX ADMIN — Medication 2: at 22:13

## 2021-08-04 RX ADMIN — HEPARIN SODIUM 5000 UNIT(S): 5000 INJECTION INTRAVENOUS; SUBCUTANEOUS at 22:13

## 2021-08-04 RX ADMIN — Medication 3: at 09:08

## 2021-08-04 RX ADMIN — Medication 4: at 12:05

## 2021-08-04 RX ADMIN — Medication 4: at 17:08

## 2021-08-04 NOTE — CONSULT NOTE ADULT - ASSESSMENT
52yo M with left foot submet 2/3 wound to subq  - pt seen and evaluated  - afebrile, no leukocytosis   - left foot submet 2/3 wound to subq, hyperkeratotic periwound, no drainage, no malodor, no fluctuance, no purulence; LLE erythema w/ warmth to the calf, + Garcia test LLE  - LLE VA Duplex showing no DVT   - LF xray showing no gas, no OM   - Left foot wound culture taken   - Recommend IV Unasyn for LLE cellulitis   - ordered JESÚS/PVR   - No acute pod surgical intervention   - pod plan LWC w/ abx, will monitor for improvement  - discussed w/ attending  52yo M with left foot submet 2/3 wound to subq  - pt seen and evaluated  - afebrile, no leukocytosis   - left foot submet 2/3 wound to subq, hyperkeratotic periwound, no drainage, no malodor, no fluctuance, no purulence; LLE erythema w/ warmth to the calf, + Garcia test LLE  - LLE VA Duplex showing no DVT   - LF xray showing no gas, no OM   - Left foot wound culture taken   - Recommend ID consult   - Recommend IV Unasyn for LLE cellulitis   - ordered JESÚS/PVR   - No acute pod surgical intervention   - pod plan LWC w/ abx, will monitor for improvement  - discussed w/ attending  52yo M with left foot submet 2/3 wound to subq  - pt seen and evaluated  - afebrile, no leukocytosis   - left foot submet 2/3 wound to subq, hyperkeratotic periwound, no drainage, no malodor, no fluctuance, no purulence; LLE erythema w/ warmth to the calf, + Garcia test LLE  - LLE VA Duplex showing no DVT   - LF xray showing no gas, no OM   - Left foot wound culture taken   - Recommend ID consult   - Recommend IV Vanco for LLE cellulitis   - ordered JESÚS/PVR   - No acute pod surgical intervention   - pod plan LWC w/ abx, will monitor for improvement  - discussed w/ attending

## 2021-08-04 NOTE — PROGRESS NOTE ADULT - ASSESSMENT
52 y/o M MPH HTN HLD DM on insulin, diabetic foot ulcer s/p multiple debridements and L great toe amputation (NUMC) presents to ED c/o worsening redness, pain, and swelling to L foot and ankle x3 days assoc w/ chills 2 days ago. Denies fevers, increased drainage, bleeding from the wound. Denies recent abx.    diabetic foot ulcer  - no evidence of infection  - r/o osteo  - esr. crp elevated  - podiatry consult    htn  - c/w home meds    uncontrolled diabetes  - fs qid  - hgb a1c 12.6  - insulin ss  - endo consult    dvt px  - heparin

## 2021-08-04 NOTE — PROGRESS NOTE ADULT - SUBJECTIVE AND OBJECTIVE BOX
DATE OF SERVICE: 08-04-21 @ 15:36    Patient is a 51y old  Male who presents with a chief complaint of foot ulcer (03 Aug 2021 20:03)      SUBJECTIVE / OVERNIGHT EVENTS:  No chest pain. No shortness of breath. No complaints. No events overnight.     MEDICATIONS  (STANDING):  aspirin enteric coated 81 milliGRAM(s) Oral daily  dextrose 40% Gel 15 Gram(s) Oral once  dextrose 5%. 1000 milliLiter(s) (50 mL/Hr) IV Continuous <Continuous>  dextrose 5%. 1000 milliLiter(s) (100 mL/Hr) IV Continuous <Continuous>  dextrose 50% Injectable 25 Gram(s) IV Push once  dextrose 50% Injectable 12.5 Gram(s) IV Push once  dextrose 50% Injectable 25 Gram(s) IV Push once  glucagon  Injectable 1 milliGRAM(s) IntraMuscular once  heparin   Injectable 5000 Unit(s) SubCutaneous every 8 hours  insulin glargine Injectable (LANTUS) 10 Unit(s) SubCutaneous at bedtime  insulin lispro (ADMELOG) corrective regimen sliding scale   SubCutaneous at bedtime  insulin lispro (ADMELOG) corrective regimen sliding scale   SubCutaneous three times a day before meals  lisinopril 10 milliGRAM(s) Oral daily  multivitamin 1 Tablet(s) Oral daily  zinc sulfate 220 milliGRAM(s) Oral daily    MEDICATIONS  (PRN):      Vital Signs Last 24 Hrs  T(C): 36.9 (04 Aug 2021 10:48), Max: 37.2 (04 Aug 2021 05:28)  T(F): 98.4 (04 Aug 2021 10:48), Max: 99 (04 Aug 2021 05:28)  HR: 103 (04 Aug 2021 10:48) (88 - 103)  BP: 140/79 (04 Aug 2021 10:48) (110/75 - 144/82)  BP(mean): --  RR: 18 (04 Aug 2021 10:48) (16 - 18)  SpO2: 97% (04 Aug 2021 10:48) (96% - 100%)  CAPILLARY BLOOD GLUCOSE      POCT Blood Glucose.: 346 mg/dL (04 Aug 2021 11:59)  POCT Blood Glucose.: 288 mg/dL (04 Aug 2021 08:18)  POCT Blood Glucose.: 314 mg/dL (03 Aug 2021 22:14)    I&O's Summary      PHYSICAL EXAM:  GENERAL: NAD, well-developed  HEAD:  Atraumatic, Normocephalic  EYES: EOMI, PERRLA, conjunctiva and sclera clear  NECK: Supple, No JVD  CHEST/LUNG: Clear to auscultation bilaterally; No wheeze  HEART: Regular rate and rhythm; No murmurs, rubs, or gallops  ABDOMEN: Soft, Nontender, Nondistended; Bowel sounds present  EXTREMITIES:  2+ Peripheral Pulses, No clubbing, cyanosis, or edema  PSYCH: AAOx3  NEUROLOGY: non-focal  SKIN: No rashes or lesions    LABS:                        14.2   6.39  )-----------( 209      ( 04 Aug 2021 06:20 )             43.1     08-04    133<L>  |  97  |  14  ----------------------------<  298<H>  4.2   |  24  |  0.45<L>    Ca    9.7      04 Aug 2021 06:20  Phos  3.1     08-04  Mg     1.8     08-04    TPro  7.8  /  Alb  3.9  /  TBili  0.7  /  DBili  x   /  AST  49<H>  /  ALT  21  /  AlkPhos  73  08-03              RADIOLOGY & ADDITIONAL TESTS:    Imaging Personally Reviewed:    Consultant(s) Notes Reviewed:      Care Discussed with Consultants/Other Providers:

## 2021-08-04 NOTE — CONSULT NOTE ADULT - SUBJECTIVE AND OBJECTIVE BOX
Podiatry pager #: [phone] ([place])/ [phone] ([facility])    Patient is a 51y old  Male who presents with a chief complaint of foot ulcer (04 Aug 2021 15:36)      HPI:  52 y/o M MPH HTN HLD DM on insulin, diabetic foot ulcer s/p multiple debridements and L great toe amputation (NUMC) presents to ED c/o worsening redness, pain, and swelling to L foot and ankle x3 days assoc w/ chills 2 days ago. Denies fevers, increased drainage, bleeding from the wound. Denies recent abx. (03 Aug 2021 20:03)      PAST MEDICAL & SURGICAL HISTORY:  HTN (hypertension)    HLD (hyperlipidemia)    DM (diabetes mellitus)    Great toe amputee        MEDICATIONS  (STANDING):  aspirin enteric coated 81 milliGRAM(s) Oral daily  dextrose 40% Gel 15 Gram(s) Oral once  dextrose 5%. 1000 milliLiter(s) (50 mL/Hr) IV Continuous <Continuous>  dextrose 5%. 1000 milliLiter(s) (100 mL/Hr) IV Continuous <Continuous>  dextrose 50% Injectable 25 Gram(s) IV Push once  dextrose 50% Injectable 12.5 Gram(s) IV Push once  dextrose 50% Injectable 25 Gram(s) IV Push once  glucagon  Injectable 1 milliGRAM(s) IntraMuscular once  heparin   Injectable 5000 Unit(s) SubCutaneous every 8 hours  insulin glargine Injectable (LANTUS) 10 Unit(s) SubCutaneous at bedtime  insulin lispro (ADMELOG) corrective regimen sliding scale   SubCutaneous at bedtime  insulin lispro (ADMELOG) corrective regimen sliding scale   SubCutaneous three times a day before meals  lisinopril 10 milliGRAM(s) Oral daily  multivitamin 1 Tablet(s) Oral daily  zinc sulfate 220 milliGRAM(s) Oral daily    MEDICATIONS  (PRN):      Allergies    penicillin (Hives)  penicillin (Rash)    Intolerances        VITALS:    Vital Signs Last 24 Hrs  T(C): 36.9 (04 Aug 2021 10:48), Max: 37.2 (04 Aug 2021 05:28)  T(F): 98.4 (04 Aug 2021 10:48), Max: 99 (04 Aug 2021 05:28)  HR: 103 (04 Aug 2021 10:48) (88 - 103)  BP: 140/79 (04 Aug 2021 10:48) (110/75 - 144/82)  BP(mean): --  RR: 18 (04 Aug 2021 10:48) (16 - 18)  SpO2: 97% (04 Aug 2021 10:48) (96% - 100%)    LABS:                          14.2   6.39  )-----------( 209      ( 04 Aug 2021 06:20 )             43.1       08-04    133<L>  |  97  |  14  ----------------------------<  298<H>  4.2   |  24  |  0.45<L>    Ca    9.7      04 Aug 2021 06:20  Phos  3.1     08-04  Mg     1.8     08-04    TPro  7.8  /  Alb  3.9  /  TBili  0.7  /  DBili  x   /  AST  49<H>  /  ALT  21  /  AlkPhos  73  08-03      CAPILLARY BLOOD GLUCOSE      POCT Blood Glucose.: 346 mg/dL (04 Aug 2021 11:59)  POCT Blood Glucose.: 288 mg/dL (04 Aug 2021 08:18)  POCT Blood Glucose.: 314 mg/dL (03 Aug 2021 22:14)          LOWER EXTREMITY PHYSICAL EXAM:    Vascular: DP/PT 0/4 B/L, CFT <3 seconds B/L, Temperature gradient warm to cool B/L  Neuro: Epicritic sensation absent  to the level of forefoot b/l   Musculoskeletal/Ortho: s/p left foot partial hallux amputation   Skin: left foot submet 2/3 wound to subq, hyperkeratotic periwound, no drainage, no malodor, no fluctuance, no purulence; LLE erythema w/ warmth to the calf, + Garcia test LLE     RADIOLOGY & ADDITIONAL STUDIES:    < from: VA Duplex Lower Ext Vein Scan, Left (08.03.21 @ 16:50) >  EXAM:  DUPLEX EXT VEINS LOWER LT                            PROCEDURE DATE:  08/03/2021            INTERPRETATION:  CLINICAL INFORMATION: Left lower extremity pain and swelling    COMPARISON: None available.    TECHNIQUE: Duplex sonography of the LEFT LOWER extremity veins with color and spectral Doppler, with and without compression.    FINDINGS:    There is normal compressibility of the left common femoral, femoral and popliteal veins.  The contralateral common femoral vein is patent.  Doppler examination shows normal spontaneous and phasic flow.    The left calf veins were not diagnostically imaged.    IMPRESSION:  No evidence of left lower extremity deep venous thrombosis.          --- End of Report ---                LUIS ENRIQUE MAY MD; Attending Radiologist  This document has been electronically signed. Aug  3 2021  4:53PM    < end of copied text >  < from: Xray Foot AP + Lateral + Oblique, Left (08.03.21 @ 13:49) >    EXAM:  XR FOOT COMP MIN 3 VIEWS LT                            PROCEDURE DATE:  08/03/2021            INTERPRETATION:  Left foot    HISTORY: Ileus    COMPARISON: None available     Three views of the left foot show no evidence of acute fracture. Thepatient has undergone amputation of the great toe and the head of the second metatarsal bone. The joint spaces are maintained.    IMPRESSION: Postoperative changes. No evidence of bony destruction.      Thank you for this referral.    --- End of Report ---                LIVIA TORO MD; Attending Interventional Radiologist  This document has been electronically signed. Aug  3 2021  2:05PM    < end of copied text >

## 2021-08-05 DIAGNOSIS — E11.621 TYPE 2 DIABETES MELLITUS WITH FOOT ULCER: ICD-10-CM

## 2021-08-05 DIAGNOSIS — I10 ESSENTIAL (PRIMARY) HYPERTENSION: ICD-10-CM

## 2021-08-05 DIAGNOSIS — E11.9 TYPE 2 DIABETES MELLITUS WITHOUT COMPLICATIONS: ICD-10-CM

## 2021-08-05 DIAGNOSIS — E66.01 MORBID (SEVERE) OBESITY DUE TO EXCESS CALORIES: ICD-10-CM

## 2021-08-05 LAB
ALBUMIN SERPL ELPH-MCNC: 3.8 G/DL — SIGNIFICANT CHANGE UP (ref 3.3–5)
ALP SERPL-CCNC: 68 U/L — SIGNIFICANT CHANGE UP (ref 40–120)
ALT FLD-CCNC: 16 U/L — SIGNIFICANT CHANGE UP (ref 10–45)
ANION GAP SERPL CALC-SCNC: 12 MMOL/L — SIGNIFICANT CHANGE UP (ref 5–17)
AST SERPL-CCNC: 15 U/L — SIGNIFICANT CHANGE UP (ref 10–40)
BASOPHILS # BLD AUTO: 0.02 K/UL — SIGNIFICANT CHANGE UP (ref 0–0.2)
BASOPHILS NFR BLD AUTO: 0.3 % — SIGNIFICANT CHANGE UP (ref 0–2)
BILIRUB SERPL-MCNC: 0.7 MG/DL — SIGNIFICANT CHANGE UP (ref 0.2–1.2)
BUN SERPL-MCNC: 16 MG/DL — SIGNIFICANT CHANGE UP (ref 7–23)
CALCIUM SERPL-MCNC: 8.8 MG/DL — SIGNIFICANT CHANGE UP (ref 8.4–10.5)
CHLORIDE SERPL-SCNC: 95 MMOL/L — LOW (ref 96–108)
CO2 SERPL-SCNC: 25 MMOL/L — SIGNIFICANT CHANGE UP (ref 22–31)
CREAT SERPL-MCNC: 0.53 MG/DL — SIGNIFICANT CHANGE UP (ref 0.5–1.3)
EOSINOPHIL # BLD AUTO: 0.12 K/UL — SIGNIFICANT CHANGE UP (ref 0–0.5)
EOSINOPHIL NFR BLD AUTO: 1.9 % — SIGNIFICANT CHANGE UP (ref 0–6)
GLUCOSE BLDC GLUCOMTR-MCNC: 262 MG/DL — HIGH (ref 70–99)
GLUCOSE BLDC GLUCOMTR-MCNC: 264 MG/DL — HIGH (ref 70–99)
GLUCOSE BLDC GLUCOMTR-MCNC: 271 MG/DL — HIGH (ref 70–99)
GLUCOSE BLDC GLUCOMTR-MCNC: 284 MG/DL — HIGH (ref 70–99)
GLUCOSE SERPL-MCNC: 355 MG/DL — HIGH (ref 70–99)
HCT VFR BLD CALC: 42.5 % — SIGNIFICANT CHANGE UP (ref 39–50)
HGB BLD-MCNC: 14 G/DL — SIGNIFICANT CHANGE UP (ref 13–17)
IMM GRANULOCYTES NFR BLD AUTO: 0.5 % — SIGNIFICANT CHANGE UP (ref 0–1.5)
LYMPHOCYTES # BLD AUTO: 1.84 K/UL — SIGNIFICANT CHANGE UP (ref 1–3.3)
LYMPHOCYTES # BLD AUTO: 29 % — SIGNIFICANT CHANGE UP (ref 13–44)
MCHC RBC-ENTMCNC: 27.9 PG — SIGNIFICANT CHANGE UP (ref 27–34)
MCHC RBC-ENTMCNC: 32.9 GM/DL — SIGNIFICANT CHANGE UP (ref 32–36)
MCV RBC AUTO: 84.7 FL — SIGNIFICANT CHANGE UP (ref 80–100)
MONOCYTES # BLD AUTO: 0.34 K/UL — SIGNIFICANT CHANGE UP (ref 0–0.9)
MONOCYTES NFR BLD AUTO: 5.4 % — SIGNIFICANT CHANGE UP (ref 2–14)
NEUTROPHILS # BLD AUTO: 3.99 K/UL — SIGNIFICANT CHANGE UP (ref 1.8–7.4)
NEUTROPHILS NFR BLD AUTO: 62.9 % — SIGNIFICANT CHANGE UP (ref 43–77)
NRBC # BLD: 0 /100 WBCS — SIGNIFICANT CHANGE UP (ref 0–0)
PLATELET # BLD AUTO: 216 K/UL — SIGNIFICANT CHANGE UP (ref 150–400)
POTASSIUM SERPL-MCNC: 3.9 MMOL/L — SIGNIFICANT CHANGE UP (ref 3.5–5.3)
POTASSIUM SERPL-SCNC: 3.9 MMOL/L — SIGNIFICANT CHANGE UP (ref 3.5–5.3)
PROT SERPL-MCNC: 7.4 G/DL — SIGNIFICANT CHANGE UP (ref 6–8.3)
RBC # BLD: 5.02 M/UL — SIGNIFICANT CHANGE UP (ref 4.2–5.8)
RBC # FLD: 14.6 % — HIGH (ref 10.3–14.5)
SODIUM SERPL-SCNC: 132 MMOL/L — LOW (ref 135–145)
WBC # BLD: 6.34 K/UL — SIGNIFICANT CHANGE UP (ref 3.8–10.5)
WBC # FLD AUTO: 6.34 K/UL — SIGNIFICANT CHANGE UP (ref 3.8–10.5)

## 2021-08-05 PROCEDURE — 99223 1ST HOSP IP/OBS HIGH 75: CPT

## 2021-08-05 RX ORDER — ERTAPENEM SODIUM 1 G/1
1000 INJECTION, POWDER, LYOPHILIZED, FOR SOLUTION INTRAMUSCULAR; INTRAVENOUS ONCE
Refills: 0 | Status: COMPLETED | OUTPATIENT
Start: 2021-08-05 | End: 2021-08-05

## 2021-08-05 RX ORDER — ERTAPENEM SODIUM 1 G/1
1000 INJECTION, POWDER, LYOPHILIZED, FOR SOLUTION INTRAMUSCULAR; INTRAVENOUS EVERY 24 HOURS
Refills: 0 | Status: DISCONTINUED | OUTPATIENT
Start: 2021-08-06 | End: 2021-08-09

## 2021-08-05 RX ORDER — ERTAPENEM SODIUM 1 G/1
INJECTION, POWDER, LYOPHILIZED, FOR SOLUTION INTRAMUSCULAR; INTRAVENOUS
Refills: 0 | Status: DISCONTINUED | OUTPATIENT
Start: 2021-08-05 | End: 2021-08-09

## 2021-08-05 RX ORDER — INSULIN GLARGINE 100 [IU]/ML
20 INJECTION, SOLUTION SUBCUTANEOUS AT BEDTIME
Refills: 0 | Status: DISCONTINUED | OUTPATIENT
Start: 2021-08-05 | End: 2021-08-06

## 2021-08-05 RX ORDER — INSULIN LISPRO 100/ML
7 VIAL (ML) SUBCUTANEOUS
Refills: 0 | Status: DISCONTINUED | OUTPATIENT
Start: 2021-08-05 | End: 2021-08-06

## 2021-08-05 RX ADMIN — LISINOPRIL 10 MILLIGRAM(S): 2.5 TABLET ORAL at 05:34

## 2021-08-05 RX ADMIN — Medication 3: at 11:54

## 2021-08-05 RX ADMIN — Medication 81 MILLIGRAM(S): at 11:55

## 2021-08-05 RX ADMIN — ERTAPENEM SODIUM 120 MILLIGRAM(S): 1 INJECTION, POWDER, LYOPHILIZED, FOR SOLUTION INTRAMUSCULAR; INTRAVENOUS at 16:21

## 2021-08-05 RX ADMIN — HEPARIN SODIUM 5000 UNIT(S): 5000 INJECTION INTRAVENOUS; SUBCUTANEOUS at 22:17

## 2021-08-05 RX ADMIN — Medication 7 UNIT(S): at 11:54

## 2021-08-05 RX ADMIN — HEPARIN SODIUM 5000 UNIT(S): 5000 INJECTION INTRAVENOUS; SUBCUTANEOUS at 05:34

## 2021-08-05 RX ADMIN — Medication 7 UNIT(S): at 17:14

## 2021-08-05 RX ADMIN — HEPARIN SODIUM 5000 UNIT(S): 5000 INJECTION INTRAVENOUS; SUBCUTANEOUS at 15:23

## 2021-08-05 RX ADMIN — Medication 1: at 22:16

## 2021-08-05 RX ADMIN — Medication 3: at 08:16

## 2021-08-05 RX ADMIN — Medication 1 TABLET(S): at 11:55

## 2021-08-05 RX ADMIN — Medication 3: at 17:14

## 2021-08-05 RX ADMIN — MUPIROCIN 1 APPLICATION(S): 20 OINTMENT TOPICAL at 17:15

## 2021-08-05 RX ADMIN — INSULIN GLARGINE 20 UNIT(S): 100 INJECTION, SOLUTION SUBCUTANEOUS at 22:16

## 2021-08-05 RX ADMIN — MUPIROCIN 1 APPLICATION(S): 20 OINTMENT TOPICAL at 06:30

## 2021-08-05 RX ADMIN — ZINC SULFATE TAB 220 MG (50 MG ZINC EQUIVALENT) 220 MILLIGRAM(S): 220 (50 ZN) TAB at 11:55

## 2021-08-05 NOTE — CONSULT NOTE ADULT - ASSESSMENT
Assessment  DMT2: 51y Male with DM T2 with hyperglycemia, admitted with foot wound, A1C 12.6%, was on oral meds and insulin at home, likely noncompliant, now on basal insulin and coverage, blood sugars running high and not at target, no hypoglycemic episode, eating meals.  Foot Ulcer: on medications, no chest pain, stable, monitored.  HTN: Controlled,  on antihypertensive medications.  Obesity: No strict exercise routines, not on any weight loss program, neither on low calorie diet.      Redd Muro MD  Cell: [phone]  Office: [phone]       Assessment  DMT2: 51y Male with DM T2 with hyperglycemia, admitted with foot wound, A1C 12.6%, was on oral meds and insulin at home, likely noncompliant, now on basal insulin and coverage,     blood sugars running high and not at target, no hypoglycemic episode, eating meals.  Foot Ulcer: on medications, no chest pain, stable, monitored.  HTN: Controlled,  on antihypertensive medications.  Obesity: No strict exercise routines, not on any weight loss program, neither on low calorie diet.      Redd Muro MD  Cell: [phone]  Office: [phone]

## 2021-08-05 NOTE — PHYSICAL THERAPY INITIAL EVALUATION ADULT - PERTINENT HX OF CURRENT PROBLEM, REHAB EVAL
51 y.o. M MPH HTN, HLD, DM, diabetic foot ulcer s/p multiple debridements & L great toe amputation (NUMC) presents to ED c/o worsening redness, pain, & swelling to L foot / ankle x3 days assoc w/ chills 2 days ago.

## 2021-08-05 NOTE — CONSULT NOTE ADULT - PROBLEM SELECTOR RECOMMENDATION 9
Patient requires tight glycemic control.  Will increase Lantus to 20u at bedtime.  Will start Admelog 7u before each meal and continue Admelog correction scale coverage. Will continue monitoring FS and FU.  Patient counseled for compliance with consistent low carb diet and exercise as tolerated outpatient.

## 2021-08-05 NOTE — PHYSICAL THERAPY INITIAL EVALUATION ADULT - ADDITIONAL COMMENTS
Pt resides in an apartment w/ uncle, 3 steps to enter building, none inside. PTA pt was independent w/ mobility w/ straight cane. States girlfriend assists w/ ADL's if needed.

## 2021-08-05 NOTE — PROGRESS NOTE ADULT - ASSESSMENT
52 y/o M MPH HTN HLD DM on insulin, diabetic foot ulcer s/p multiple debridements and L great toe amputation (NUMC) presents to ED c/o worsening redness, pain, and swelling to L foot and ankle x3 days assoc w/ chills 2 days ago. Denies fevers, increased drainage, bleeding from the wound. Denies recent abx.    diabetic foot ulcer  - no evidence of infection  - ID called  - abx as per ID  - esr. crp elevated  - podiatry consult following    htn  - c/w home meds    uncontrolled diabetes  - fs qid  - hgb a1c 12.6  - insulin ss  - endo consult    dvt px  - heparin

## 2021-08-05 NOTE — PROGRESS NOTE ADULT - SUBJECTIVE AND OBJECTIVE BOX
DATE OF SERVICE: 08-05-21 @ 13:34    Patient is a 51y old  Male who presents with a chief complaint of foot ulcer (05 Aug 2021 13:12)      SUBJECTIVE / OVERNIGHT EVENTS:  No chest pain. No shortness of breath. No complaints. No events overnight.     MEDICATIONS  (STANDING):  aspirin enteric coated 81 milliGRAM(s) Oral daily  dextrose 40% Gel 15 Gram(s) Oral once  dextrose 5%. 1000 milliLiter(s) (50 mL/Hr) IV Continuous <Continuous>  dextrose 5%. 1000 milliLiter(s) (100 mL/Hr) IV Continuous <Continuous>  dextrose 50% Injectable 25 Gram(s) IV Push once  dextrose 50% Injectable 12.5 Gram(s) IV Push once  dextrose 50% Injectable 25 Gram(s) IV Push once  glucagon  Injectable 1 milliGRAM(s) IntraMuscular once  heparin   Injectable 5000 Unit(s) SubCutaneous every 8 hours  insulin glargine Injectable (LANTUS) 20 Unit(s) SubCutaneous at bedtime  insulin lispro (ADMELOG) corrective regimen sliding scale   SubCutaneous at bedtime  insulin lispro (ADMELOG) corrective regimen sliding scale   SubCutaneous three times a day before meals  insulin lispro Injectable (ADMELOG) 7 Unit(s) SubCutaneous three times a day before meals  lisinopril 10 milliGRAM(s) Oral daily  multivitamin 1 Tablet(s) Oral daily  mupirocin 2% Ointment 1 Application(s) Topical two times a day  zinc sulfate 220 milliGRAM(s) Oral daily    MEDICATIONS  (PRN):      Vital Signs Last 24 Hrs  T(C): 36.8 (05 Aug 2021 08:43), Max: 37.1 (04 Aug 2021 16:38)  T(F): 98.3 (05 Aug 2021 08:43), Max: 98.8 (04 Aug 2021 16:38)  HR: 102 (05 Aug 2021 08:43) (88 - 103)  BP: 127/76 (05 Aug 2021 08:43) (105/67 - 136/83)  BP(mean): --  RR: 18 (05 Aug 2021 08:43) (18 - 18)  SpO2: 96% (05 Aug 2021 08:43) (95% - 98%)  CAPILLARY BLOOD GLUCOSE      POCT Blood Glucose.: 271 mg/dL (05 Aug 2021 11:46)  POCT Blood Glucose.: 284 mg/dL (05 Aug 2021 07:52)  POCT Blood Glucose.: 349 mg/dL (04 Aug 2021 21:40)  POCT Blood Glucose.: 337 mg/dL (04 Aug 2021 16:55)    I&O's Summary    04 Aug 2021 07:01  -  05 Aug 2021 07:00  --------------------------------------------------------  IN: 0 mL / OUT: 600 mL / NET: -600 mL    05 Aug 2021 07:01  -  05 Aug 2021 13:34  --------------------------------------------------------  IN: 350 mL / OUT: 0 mL / NET: 350 mL        PHYSICAL EXAM:  GENERAL: NAD, well-developed  HEAD:  Atraumatic, Normocephalic  EYES: EOMI, PERRLA, conjunctiva and sclera clear  NECK: Supple, No JVD  CHEST/LUNG: Clear to auscultation bilaterally; No wheeze  HEART: Regular rate and rhythm; No murmurs, rubs, or gallops  ABDOMEN: Soft, Nontender, Nondistended; Bowel sounds present  EXTREMITIES:  2+ Peripheral Pulses, No clubbing, cyanosis, or edema  PSYCH: AAOx3  NEUROLOGY: non-focal  SKIN: No rashes or lesions    LABS:                        14.0   6.34  )-----------( 216      ( 05 Aug 2021 09:15 )             42.5     08-05    132<L>  |  95<L>  |  16  ----------------------------<  355<H>  3.9   |  25  |  0.53    Ca    8.8      05 Aug 2021 09:15  Phos  3.1     08-04  Mg     1.8     08-04    TPro  7.4  /  Alb  3.8  /  TBili  0.7  /  DBili  x   /  AST  15  /  ALT  16  /  AlkPhos  68  08-05              RADIOLOGY & ADDITIONAL TESTS:    Imaging Personally Reviewed:    Consultant(s) Notes Reviewed:      Care Discussed with Consultants/Other Providers:

## 2021-08-05 NOTE — PROGRESS NOTE ADULT - SUBJECTIVE AND OBJECTIVE BOX
Patient is a 51y old  Male who presents with a chief complaint of foot ulcer (04 Aug 2021 15:58)       INTERVAL HPI/OVERNIGHT EVENTS:  Patient seen and evaluated at bedside.  Pt is resting comfortable in NAD. Denies N/V/F/C.      Allergies    penicillin (Hives)  penicillin (Rash)    Intolerances        Vital Signs Last 24 Hrs  T(C): 36.8 (05 Aug 2021 08:43), Max: 37.1 (04 Aug 2021 16:38)  T(F): 98.3 (05 Aug 2021 08:43), Max: 98.8 (04 Aug 2021 16:38)  HR: 102 (05 Aug 2021 08:43) (88 - 103)  BP: 127/76 (05 Aug 2021 08:43) (105/67 - 140/79)  BP(mean): --  RR: 18 (05 Aug 2021 08:43) (18 - 18)  SpO2: 96% (05 Aug 2021 08:43) (95% - 98%)    LABS:                        14.2   6.39  )-----------( 209      ( 04 Aug 2021 06:20 )             43.1     08-04    133<L>  |  97  |  14  ----------------------------<  298<H>  4.2   |  24  |  0.45<L>    Ca    9.7      04 Aug 2021 06:20  Phos  3.1     08-04  Mg     1.8     08-04    TPro  7.8  /  Alb  3.9  /  TBili  0.7  /  DBili  x   /  AST  49<H>  /  ALT  21  /  AlkPhos  73  08-03        CAPILLARY BLOOD GLUCOSE      POCT Blood Glucose.: 284 mg/dL (05 Aug 2021 07:52)  POCT Blood Glucose.: 349 mg/dL (04 Aug 2021 21:40)  POCT Blood Glucose.: 337 mg/dL (04 Aug 2021 16:55)  POCT Blood Glucose.: 346 mg/dL (04 Aug 2021 11:59)      Lower Extremity Physical Exam:  Vascular: DP/PT 0/4 B/L, CFT <3 seconds B/L, Temperature gradient warm to cool B/L  Neuro: Epicritic sensation absent  to the level of forefoot b/l   Musculoskeletal/Ortho: s/p left foot partial hallux amputation   Skin: left foot submet 2/3 wound to subq, hyperkeratotic periwound, no drainage, no malodor, no fluctuance, no purulence; LLE erythema w/ warmth to the calf, + Garcia test LLE     RADIOLOGY & ADDITIONAL TESTS:

## 2021-08-05 NOTE — PROGRESS NOTE ADULT - ASSESSMENT
52yo M with left foot submet 2/3 wound to subq  - pt seen and evaluated  - afebrile, no leukocytosis   - left foot submet 2/3 wound to subq, hyperkeratotic periwound, no drainage, no malodor, no fluctuance, no purulence; LLE erythema and warmth resolving and improving, + Garcia test LLE  - LLE VA Duplex showing no DVT   - LF xray showing no gas, no OM   - Left foot wound culture taken; follow ID final recs for ABx regimen & wound culture   - No acute pod surgical intervention  - discussed w/ attending

## 2021-08-05 NOTE — CONSULT NOTE ADULT - SUBJECTIVE AND OBJECTIVE BOX
HPI:  52 y/o M MPH HTN HLD DM on insulin, diabetic foot ulcer s/p multiple debridements and L great toe amputation (NUMC) presents to ED c/o worsening redness, pain, and swelling to L foot and ankle x3 days assoc w/ chills 2 days ago. Denies fevers, increased drainage, bleeding from the wound. Denies recent abx. (03 Aug 2021 20:03)  Patient has history of diabetes, A1C 12.6%, on oral meds and insulin at home, likely noncompliant. Patient follows up with PCP for diabetes management.  Endo was consulted for glycemic control.    PAST MEDICAL & SURGICAL HISTORY:  HTN (hypertension)    HLD (hyperlipidemia)    DM (diabetes mellitus)    Great toe amputee        FAMILY HISTORY:      Social History:    Outpatient Medications:    MEDICATIONS  (STANDING):  aspirin enteric coated 81 milliGRAM(s) Oral daily  dextrose 40% Gel 15 Gram(s) Oral once  dextrose 5%. 1000 milliLiter(s) (50 mL/Hr) IV Continuous <Continuous>  dextrose 5%. 1000 milliLiter(s) (100 mL/Hr) IV Continuous <Continuous>  dextrose 50% Injectable 25 Gram(s) IV Push once  dextrose 50% Injectable 12.5 Gram(s) IV Push once  dextrose 50% Injectable 25 Gram(s) IV Push once  glucagon  Injectable 1 milliGRAM(s) IntraMuscular once  heparin   Injectable 5000 Unit(s) SubCutaneous every 8 hours  insulin glargine Injectable (LANTUS) 20 Unit(s) SubCutaneous at bedtime  insulin lispro (ADMELOG) corrective regimen sliding scale   SubCutaneous at bedtime  insulin lispro (ADMELOG) corrective regimen sliding scale   SubCutaneous three times a day before meals  insulin lispro Injectable (ADMELOG) 7 Unit(s) SubCutaneous three times a day before meals  lisinopril 10 milliGRAM(s) Oral daily  multivitamin 1 Tablet(s) Oral daily  mupirocin 2% Ointment 1 Application(s) Topical two times a day  zinc sulfate 220 milliGRAM(s) Oral daily    MEDICATIONS  (PRN):      Allergies    penicillin (Hives)  penicillin (Rash)    Intolerances      Review of Systems:  Constitutional: No fever, no chills  Eyes: No blurry vision  Neuro: No tremors  HEENT: No pain, no neck swelling  Cardiovascular: No chest pain, no palpitations  Respiratory: Has SOB, no cough  GI: No nausea, vomiting, abdominal pain  : No dysuria  Skin: no rash  MSK: Has leg swelling.  Psych: no depression  Endocrine: no polyuria, polydipsia    ALL OTHER SYSTEMS REVIEWED AND NEGATIVE    UNABLE TO OBTAIN    PHYSICAL EXAM:  VITALS: T(C): 36.8 (08-05-21 @ 08:43)  T(F): 98.3 (08-05-21 @ 08:43), Max: 98.8 (08-04-21 @ 16:38)  HR: 102 (08-05-21 @ 08:43) (88 - 103)  BP: 127/76 (08-05-21 @ 08:43) (105/67 - 136/83)  RR:  (18 - 18)  SpO2:  (95% - 98%)  Wt(kg): --  GENERAL: NAD, well-groomed, well-developed  EYES: No proptosis, no lid lag  HEENT:  Atraumatic, Normocephalic  THYROID: Normal size, no palpable nodules  RESPIRATORY: Clear to auscultation bilaterally; No rales, rhonchi, wheezing  CARDIOVASCULAR: Si S2, No murmurs;  GI: Soft, non distended, normal bowel sounds  SKIN: Dry, intact, No rashes or lesions  MUSCULOSKELETAL: Has BL lower extremity edema.  NEURO:  no tremor, sensation decreased in feet BL,    POCT Blood Glucose.: 271 mg/dL (08-05-21 @ 11:46)  POCT Blood Glucose.: 284 mg/dL (08-05-21 @ 07:52)  POCT Blood Glucose.: 349 mg/dL (08-04-21 @ 21:40)  POCT Blood Glucose.: 337 mg/dL (08-04-21 @ 16:55)  POCT Blood Glucose.: 346 mg/dL (08-04-21 @ 11:59)  POCT Blood Glucose.: 288 mg/dL (08-04-21 @ 08:18)  POCT Blood Glucose.: 314 mg/dL (08-03-21 @ 22:14)                            14.0   6.34  )-----------( 216      ( 05 Aug 2021 09:15 )             42.5       08-05    132<L>  |  95<L>  |  16  ----------------------------<  355<H>  3.9   |  25  |  0.53    EGFR if : 143  EGFR if non : 123    Ca    8.8      08-05  Mg     1.8     08-04  Phos  3.1     08-04    TPro  7.4  /  Alb  3.8  /  TBili  0.7  /  DBili  x   /  AST  15  /  ALT  16  /  AlkPhos  68  08-05      Thyroid Function Tests:  08-04 @ 08:40 TSH 4.17 FreeT4 -- T3 -- Anti TPO -- Anti Thyroglobulin Ab -- TSI --              Radiology:                    HPI:  52 y/o M MPH HTN HLD DM on insulin, diabetic foot ulcer s/p multiple debridements and L great toe amputation  (NUMC) presents to ED c/o worsening redness, pain, and swelling to L foot and ankle x3 days assoc w/ chills 2 days ago. Denies fevers, increased drainage, bleeding from the wound. Denies recent abx. (03 Aug 2021 20:03)  Patient has history of diabetes, A1C 12.6%, on oral meds and insulin at home, likely noncompliant. Patient follows up with PCP for diabetes management.  Endo was consulted for glycemic control.    PAST MEDICAL & SURGICAL HISTORY:  HTN (hypertension)    HLD (hyperlipidemia)    DM (diabetes mellitus)    Great toe amputee        FAMILY HISTORY:      Social History:    Outpatient Medications:    MEDICATIONS  (STANDING):  aspirin enteric coated 81 milliGRAM(s) Oral daily  dextrose 40% Gel 15 Gram(s) Oral once  dextrose 5%. 1000 milliLiter(s) (50 mL/Hr) IV Continuous <Continuous>  dextrose 5%. 1000 milliLiter(s) (100 mL/Hr) IV Continuous <Continuous>  dextrose 50% Injectable 25 Gram(s) IV Push once  dextrose 50% Injectable 12.5 Gram(s) IV Push once  dextrose 50% Injectable 25 Gram(s) IV Push once  glucagon  Injectable 1 milliGRAM(s) IntraMuscular once  heparin   Injectable 5000 Unit(s) SubCutaneous every 8 hours  insulin glargine Injectable (LANTUS) 20 Unit(s) SubCutaneous at bedtime  insulin lispro (ADMELOG) corrective regimen sliding scale   SubCutaneous at bedtime  insulin lispro (ADMELOG) corrective regimen sliding scale   SubCutaneous three times a day before meals  insulin lispro Injectable (ADMELOG) 7 Unit(s) SubCutaneous three times a day before meals  lisinopril 10 milliGRAM(s) Oral daily  multivitamin 1 Tablet(s) Oral daily  mupirocin 2% Ointment 1 Application(s) Topical two times a day  zinc sulfate 220 milliGRAM(s) Oral daily    MEDICATIONS  (PRN):      Allergies    penicillin (Hives)  penicillin (Rash)    Intolerances      Review of Systems:  Constitutional: No fever, no chills  Eyes: No blurry vision  Neuro: No tremors  HEENT: No pain, no neck swelling  Cardiovascular: No chest pain, no palpitations  Respiratory: Has SOB, no cough  GI: No nausea, vomiting, abdominal pain  : No dysuria  Skin: no rash  MSK: Has leg swelling.  Psych: no depression  Endocrine: no polyuria, polydipsia    ALL OTHER SYSTEMS REVIEWED AND NEGATIVE    UNABLE TO OBTAIN    PHYSICAL EXAM:  VITALS: T(C): 36.8 (08-05-21 @ 08:43)  T(F): 98.3 (08-05-21 @ 08:43), Max: 98.8 (08-04-21 @ 16:38)  HR: 102 (08-05-21 @ 08:43) (88 - 103)  BP: 127/76 (08-05-21 @ 08:43) (105/67 - 136/83)  RR:  (18 - 18)  SpO2:  (95% - 98%)  Wt(kg): --  GENERAL: NAD, well-groomed, well-developed  EYES: No proptosis, no lid lag  HEENT:  Atraumatic, Normocephalic  THYROID: Normal size, no palpable nodules  RESPIRATORY: Clear to auscultation bilaterally; No rales, rhonchi, wheezing  CARDIOVASCULAR: Si S2, No murmurs;  GI: Soft, non distended, normal bowel sounds  SKIN: Dry, intact, No rashes or lesions  MUSCULOSKELETAL: Has BL lower extremity edema.  NEURO:  no tremor, sensation decreased in feet BL,    POCT Blood Glucose.: 271 mg/dL (08-05-21 @ 11:46)  POCT Blood Glucose.: 284 mg/dL (08-05-21 @ 07:52)  POCT Blood Glucose.: 349 mg/dL (08-04-21 @ 21:40)  POCT Blood Glucose.: 337 mg/dL (08-04-21 @ 16:55)  POCT Blood Glucose.: 346 mg/dL (08-04-21 @ 11:59)  POCT Blood Glucose.: 288 mg/dL (08-04-21 @ 08:18)  POCT Blood Glucose.: 314 mg/dL (08-03-21 @ 22:14)                            14.0   6.34  )-----------( 216      ( 05 Aug 2021 09:15 )             42.5       08-05    132<L>  |  95<L>  |  16  ----------------------------<  355<H>  3.9   |  25  |  0.53    EGFR if : 143  EGFR if non : 123    Ca    8.8      08-05  Mg     1.8     08-04  Phos  3.1     08-04    TPro  7.4  /  Alb  3.8  /  TBili  0.7  /  DBili  x   /  AST  15  /  ALT  16  /  AlkPhos  68  08-05      Thyroid Function Tests:  08-04 @ 08:40 TSH 4.17 FreeT4 -- T3 -- Anti TPO -- Anti Thyroglobulin Ab -- TSI --              Radiology:

## 2021-08-05 NOTE — PHYSICAL THERAPY INITIAL EVALUATION ADULT - PLANNED THERAPY INTERVENTIONS, PT EVAL
Medicare Wellness Visit  Plan for Preventive Care    A good way for you to stay healthy is to use preventive care.  Medicare covers many services that can help you stay healthy.* The goal of these services is to find any health problems as quickly as possible. Finding problems early can help make them easier to treat.  Your personal plan below lists the services you may need and when they are due.     Health Maintenance Summary     Topic Due On Due Status Completed On    Abdominal Aortic Aneurysm (AAA) Screening  Aug 30, 2008 Overdue     Medicare Wellness Visit Sep 12, 2018 Due On Sep 12, 2017    IMMUNIZATION - DTaP/Tdap/Td May 1, 2017 Overdue May 1, 2007    Immunization-Influenza Jul 1, 2018 Overdue     Depression Screening Sep 27, 2019 Not Due Sep 27, 2018    Lung Cancer Screening Jun 25, 2019 Not Due Jun 25, 2018    Pneumococcal Vaccine 65+ Low/Medium Risk Aug 21, 2016 Overdue Aug 21, 2015    Immunization - Shingles Aug 30, 1993 Overdue     Immunization - MMR  Hidden            Preventive Care for Women and Men    Heart Screenings (Cardiovascular):  · Blood tests are used to check your cholesterol, lipid and triglyceride levels. High levels can increase your risk for heart disease and stroke. High levels can be treated with medications, diet and exercise. Lowering your levels can help keep your heart and blood vessels healthy.  Your provider will order these tests if they are needed.    · An ultrasound is done to see if you have an abdominal aortic aneurysm (AAA).  This is an enlargement of one of the main blood vessels that delivers blood to the body.   In the [place], 9,000 deaths are caused by AAA.  You may not even know you have this problem and as many as 1 in 3 people will have a serious problem if it is not treated.  Early diagnosis allows for more effective treatment and cure.  If you have a family history of AAA or are a male age 65-75 who has smoked, you are at higher risk of an AAA.  Your  provider can order this test, if needed.    Colorectal Screening:  · There are many tests that are used to check for cancer of your colon and rectum. You and your provider should discuss what test is best for you and when to have it done.  Options include:  · Screening Colonoscopy: exam of the entire colon, seen through a flexible lighted tube.  · Flexible Sigmoidoscopy: exam of the last third (sigmoid portion) of the colon and rectum, seen through a flexible lighted tube.  · Cologuard DNA stool test: a sample of your stool is used to screen for cancer and unseen blood in your stool.  · Fecal Occult Blood Test: a sample of your stool is studied to find any unseen blood    Flu Shot:  · An immunization that helps to prevent influenza (the flu). You should get this every year. The best time to get the shot is in the fall.    Pneumococcal Shot:  • Vaccines are available that can help prevent pneumococcal disease, which is any type of infection caused by Streptococcus pneumoniae bacteria.   Their use can prevent some cases of pneumonia, meningitis, and sepsis. There are two types of pneumococcal vaccines:   o Conjugate vaccines (PCV-13 or Prevnar 13®) - helps protect against the 13 types of pneumococcal bacteria that are the most common causes of serious infections in children and adults.    o Polysaccharide vaccine (PPSV23 or Canlfktcr21®) - helps protect against 23 types of pneumococcal bacteria for patients who are recommended to get it.  These vaccines should be given at least 12 months apart.  A booster is usually not needed.     Hepatitis B Shot:  · An immunization that helps to protect people from getting Hepatitis B. Hepatitis B is a virus that spreads through contact with infected blood or body fluids. Many people with the virus do not have symptoms.  The virus can lead to serious problems, such as liver disease. Some people are at higher risk than others. Your doctor will tell you if you need this shot.      Diabetes Screening:  · A test to measure sugar (glucose) in your blood is called a fasting blood sugar. Fasting means you cannot have food or drink for at least 8 hours before the test. This test can detect diabetes long before you may notice symptoms.    Glaucoma Screening:  · Glaucoma screening is performed by your eye doctor. The test measures the fluid pressure inside your eyes to determine if you have glaucoma.     Hepatitis C Screening:  · A blood test to see if you have the hepatitis C virus.  Hepatitis C attacks the liver and is a major cause of chronic liver disease.  Medicare will cover a single screening for all adults born between 1945 & 1965, or high risk patients (people who have injected illegal drugs or people who have had blood transfusions).  High risk patients who continue to inject illegal drugs can be screened for Hepatitis C every year.    Smoking and Tobacco-Use Cessation Counseling:  · Tobacco is the single greatest cause of disease and early death in our country today. Medication and counseling together can increase a person’s chance of quitting for good.   · Medicare covers two quitting attempts per year, with four counseling sessions per attempt (eight sessions in a 12 month period)    Preventive Screening tests for Women    Screening Mammograms and Breast Exams:  · An x-ray of your breasts to check for breast cancer before you or your doctor may be able to feel it.  If breast cancer is found early it can usually be treated with success.    Pelvic Exams and Pap Tests:  · An exam to check for cervical and vaginal cancer. A Pap test is a lab test in which cells are taken from your cervix and sent to the lab to look for signs of cervical cancer. If cancer of the cervix is found early, chances for a cure are good. Testing can generally end at age 65, or if a woman has a hysterectomy for a benign condition. Your provider may recommend more frequent testing if certain abnormal results are  found.    Bone Mass Measurements:  · A painless x-ray of your bone density to see if you are at risk for a broken bone. Bone density refers to the thickness of bones or how tightly the bone tissue is packed.    Preventive Screening tests for Men    Prostate Screening:  · PSA - Prostate Cancer blood test.  Experts do not recommend routine screening of healthy men with no signs or symptoms of prostate disease.  However, men should not ignore urinary symptoms, and should discuss their family history with their doctor.    *Medicare pays for many preventive services to keep you healthy. For some of these services, you might have to pay a deductible, coinsurance, and / or copayment.  The amounts vary depending on the type of services you need and the kind of Medicare health plan you have.               1. GOAL: In 3 weeks, pt will navigate 3 steps independently./bed mobility training/gait training

## 2021-08-05 NOTE — CONSULT NOTE ADULT - SUBJECTIVE AND OBJECTIVE BOX
Patient is a 51y old  Male who presents with a chief complaint of foot ulcer (05 Aug 2021 09:24)    HPI:  52 y/o M MPH HTN HLD, poorly controlled DM (8/4: Hgb A1C = 12.6) on insulin, diabetic foot ulcer s/p multiple debridements and L great toe amputation (NUMC) presents to ED c/o worsening redness, pain, and swelling to L foot and ankle x3 days assoc w/ chills 2 days ago. Denies fevers, increased drainage, bleeding from the wound. Denies recent abx. (03 Aug 2021 20:03)  patient walks a lot and has pain over left 3rd met head with long standing ulcer  no fevers, chills.  He has no numbness      PAST MEDICAL & SURGICAL HISTORY:  HTN (hypertension)    HLD (hyperlipidemia)    DM (diabetes mellitus)  (08.04.21 @ 09:30)   A1C with Estimated Average Glucose Result: 12.6    Great toe amputee   Jan 2020        Social history: disabled, previously worked as , never smoker, no etoh    FAMILY HISTORY:  HTN, asthma      REVIEW OF SYSTEMS:  CONSTITUTIONAL: No weakness, fevers or chills  EYES/ENT: No visual changes;  No vertigo or throat pain   NECK: No pain or stiffness  RESPIRATORY: No cough, wheezing, hemoptysis; No shortness of breath  CARDIOVASCULAR: No chest pain or palpitations  GASTROINTESTINAL: No abdominal or epigastric pain. No nausea, vomiting, or hematemesis; No diarrhea or constipation. No melena or hematochezia.  GENITOURINARY: No dysuria, frequency or hematuria  NEUROLOGICAL: No numbness or weakness  SKIN: No itching, burning, rashes, or lesions apart from painful planter ulcer   All other review of systems is negative unless indicated above    Allergies  penicillin (Rash)  diffuse rash, delayed onset  4/18    Antimicrobials:      Vital Signs Last 24 Hrs  T(C): 36.8 (05 Aug 2021 08:43), Max: 37.1 (04 Aug 2021 16:38)  T(F): 98.3 (05 Aug 2021 08:43), Max: 98.8 (04 Aug 2021 16:38)  HR: 102 (05 Aug 2021 08:43) (88 - 103)  BP: 127/76 (05 Aug 2021 08:43) (105/67 - 136/83)  BP(mean): --  RR: 18 (05 Aug 2021 08:43) (18 - 18)  SpO2: 96% (05 Aug 2021 08:43) (95% - 98%)    PHYSICAL EXAM:  General: WN/WD NAD, Non-toxic  Neurology: A&Ox3, nonfocal  Respiratory: Clear to auscultation bilaterally  CV: RRR, S1S2, no murmurs, rubs or gallops  Abdominal: Soft, Non-tender, non-distended, normal bowel sounds  Extremities: No edema,  Line Sites: Clear  Skin: No rash  venous stasis skin changes LLE  s/o toe amps  plantar ulcer with fibrinous base                        14.0   6.34  )-----------( 216      ( 05 Aug 2021 09:15 )             42.5   WBC Count: 6.34 (08-05 @ 09:15)  WBC Count: 6.39 (08-04 @ 06:20)  WBC Count: 5.94 (08-03 @ 13:53)      08-05    132<L>  |  95<L>  |  16  ----------------------------<  355<H>  3.9   |  25  |  0.53    Ca    8.8      05 Aug 2021 09:15  Phos  3.1     08-04  Mg     1.8     08-04    TPro  7.4  /  Alb  3.8  /  TBili  0.7  /  DBili  x   /  AST  15  /  ALT  16  /  AlkPhos  68  08-05        MICROBIOLOGY:  .Blood Blood  08-03-21   No growth to date.  --  --    Radiology:  < from: Xray Foot AP + Lateral + Oblique, Left (08.03.21 @ 13:49) >  INTERPRETATION:  Left foot    HISTORY: Ileus    COMPARISON: None available     Three views of the left foot show no evidence of acute fracture. Thepatient has undergone amputation of the great toe and the head of the second metatarsal bone. The joint spaces are maintained.    IMPRESSION: Postoperative changes. No evidence of bony destruction.    < end of copied text >  < from: [facility] Duplex Lower Ext Vein Scan, Left (08.03.21 @ 16:50) >  IMPRESSION:  No evidence of left lower extremity deep venous thrombosis.    < end of copied text >    Arash Carvajal MD; Division of Infectious Disease; Pager: [phone]; nights and weekends: [phone]

## 2021-08-05 NOTE — CONSULT NOTE ADULT - ASSESSMENT
50 y/o M MPH HTN HLD, poorly controlled DM (8/4: Hgb A1C = 12.6) on insulin, morbid obesity (BMI = 50.7) diabetic foot ulcer s/p multiple debridements and L great toe amputation on 1/20 admitted 8/3/21 with painful left plantar ulcer.   On lateral foot film, there appears to be osteophyte arising from second metatarsal head which may be contributing to pressure injury perpetuating plantar ulceration.    Problems  plantar ulcer  poorly controlled DM  morbid obesity  likely PAD  PCN allergy      There is limited role for antibiotics. Local wound care, off loading and consideration of surgical revision of 2nd left met head resection to remove osteophyte are primary considerations. He describes himself as restless, "I walk a lot" and may do well with off loading shoe.    Suggest  Ertapenem ordered - 3-5 day duration anticipated  arterial dopplers  glycemic control  HIV screen  MRSA PCR swab  continue local care    Podiatry follow up    discussed with primary attending  I will be out until 8/10: ID service to follow     52 y/o M MPH HTN HLD, poorly controlled DM (8/4: Hgb A1C = 12.6) on insulin, morbid obesity (BMI = 50.7) diabetic foot ulcer s/p multiple debridements and L great toe amputation on 1/20 admitted 8/3/21 with painful left plantar ulcer.   On lateral foot film, there appears to be osteophyte arising from second metatarsal head which may be contributing to pressure injury perpetuating plantar ulceration.    Problems  plantar ulcer  poorly controlled DM  morbid obesity  likely PAD  PCN allergy      There is limited role for antibiotics. Local wound care, off loading and consideration of surgical revision of 2nd left met head resection to remove osteophyte are primary considerations. He describes himself as restless, "I walk a lot" and may do well with off loading shoe.    Addendum: discussed with Podiatry - what I am misreading as osteophyte is sideways projection of sesamoid bones.  Suggest  Ertapenem ordered - 3-5 day duration anticipated  arterial dopplers  glycemic control  HIV screen  MRSA PCR swab  continue local care    Podiatry follow up    discussed with primary attending  I will be out until 8/10: ID service to follow

## 2021-08-06 LAB
-  AMOXICILLIN/CLAVULANIC ACID: SIGNIFICANT CHANGE UP
-  AMPICILLIN/SULBACTAM: SIGNIFICANT CHANGE UP
-  AMPICILLIN: SIGNIFICANT CHANGE UP
-  CEFAZOLIN: SIGNIFICANT CHANGE UP
-  CEFTRIAXONE: SIGNIFICANT CHANGE UP
-  CIPROFLOXACIN: SIGNIFICANT CHANGE UP
-  CLINDAMYCIN: SIGNIFICANT CHANGE UP
-  DAPTOMYCIN: SIGNIFICANT CHANGE UP
-  ERYTHROMYCIN: SIGNIFICANT CHANGE UP
-  GENTAMICIN: SIGNIFICANT CHANGE UP
-  LEVOFLOXACIN: SIGNIFICANT CHANGE UP
-  LINEZOLID: SIGNIFICANT CHANGE UP
-  MEROPENEM: SIGNIFICANT CHANGE UP
-  MOXIFLOXACIN(AEROBIC): SIGNIFICANT CHANGE UP
-  OXACILLIN: SIGNIFICANT CHANGE UP
-  PENICILLIN: SIGNIFICANT CHANGE UP
-  RIFAMPIN: SIGNIFICANT CHANGE UP
-  TETRACYCLINE: SIGNIFICANT CHANGE UP
-  TRIMETHOPRIM/SULFAMETHOXAZOLE: SIGNIFICANT CHANGE UP
-  VANCOMYCIN: SIGNIFICANT CHANGE UP
CULTURE RESULTS: SIGNIFICANT CHANGE UP
GLUCOSE BLDC GLUCOMTR-MCNC: 210 MG/DL — HIGH (ref 70–99)
GLUCOSE BLDC GLUCOMTR-MCNC: 228 MG/DL — HIGH (ref 70–99)
GLUCOSE BLDC GLUCOMTR-MCNC: 286 MG/DL — HIGH (ref 70–99)
GLUCOSE BLDC GLUCOMTR-MCNC: 291 MG/DL — HIGH (ref 70–99)
HIV 1+2 AB+HIV1 P24 AG SERPL QL IA: SIGNIFICANT CHANGE UP
METHOD TYPE: SIGNIFICANT CHANGE UP
MRSA PCR RESULT.: SIGNIFICANT CHANGE UP
ORGANISM # SPEC MICROSCOPIC CNT: SIGNIFICANT CHANGE UP
ORGANISM # SPEC MICROSCOPIC CNT: SIGNIFICANT CHANGE UP
S AUREUS DNA NOSE QL NAA+PROBE: SIGNIFICANT CHANGE UP
SPECIMEN SOURCE: SIGNIFICANT CHANGE UP

## 2021-08-06 PROCEDURE — 99232 SBSQ HOSP IP/OBS MODERATE 35: CPT

## 2021-08-06 PROCEDURE — 93923 UPR/LXTR ART STDY 3+ LVLS: CPT | Mod: 26

## 2021-08-06 RX ORDER — INSULIN LISPRO 100/ML
10 VIAL (ML) SUBCUTANEOUS
Refills: 0 | Status: DISCONTINUED | OUTPATIENT
Start: 2021-08-06 | End: 2021-08-07

## 2021-08-06 RX ORDER — INSULIN GLARGINE 100 [IU]/ML
28 INJECTION, SOLUTION SUBCUTANEOUS AT BEDTIME
Refills: 0 | Status: DISCONTINUED | OUTPATIENT
Start: 2021-08-06 | End: 2021-08-07

## 2021-08-06 RX ADMIN — MUPIROCIN 1 APPLICATION(S): 20 OINTMENT TOPICAL at 21:22

## 2021-08-06 RX ADMIN — ERTAPENEM SODIUM 120 MILLIGRAM(S): 1 INJECTION, POWDER, LYOPHILIZED, FOR SOLUTION INTRAMUSCULAR; INTRAVENOUS at 13:36

## 2021-08-06 RX ADMIN — Medication 10 UNIT(S): at 13:37

## 2021-08-06 RX ADMIN — ZINC SULFATE TAB 220 MG (50 MG ZINC EQUIVALENT) 220 MILLIGRAM(S): 220 (50 ZN) TAB at 11:36

## 2021-08-06 RX ADMIN — Medication 7 UNIT(S): at 08:14

## 2021-08-06 RX ADMIN — Medication 10 UNIT(S): at 17:16

## 2021-08-06 RX ADMIN — Medication 81 MILLIGRAM(S): at 11:36

## 2021-08-06 RX ADMIN — HEPARIN SODIUM 5000 UNIT(S): 5000 INJECTION INTRAVENOUS; SUBCUTANEOUS at 05:39

## 2021-08-06 RX ADMIN — INSULIN GLARGINE 28 UNIT(S): 100 INJECTION, SOLUTION SUBCUTANEOUS at 21:21

## 2021-08-06 RX ADMIN — MUPIROCIN 1 APPLICATION(S): 20 OINTMENT TOPICAL at 11:36

## 2021-08-06 RX ADMIN — Medication 3: at 13:34

## 2021-08-06 RX ADMIN — Medication 1 TABLET(S): at 11:36

## 2021-08-06 RX ADMIN — HEPARIN SODIUM 5000 UNIT(S): 5000 INJECTION INTRAVENOUS; SUBCUTANEOUS at 13:36

## 2021-08-06 RX ADMIN — Medication 2: at 17:17

## 2021-08-06 RX ADMIN — HEPARIN SODIUM 5000 UNIT(S): 5000 INJECTION INTRAVENOUS; SUBCUTANEOUS at 21:21

## 2021-08-06 RX ADMIN — LISINOPRIL 10 MILLIGRAM(S): 2.5 TABLET ORAL at 05:39

## 2021-08-06 RX ADMIN — Medication 3: at 08:14

## 2021-08-06 NOTE — PROGRESS NOTE ADULT - SUBJECTIVE AND OBJECTIVE BOX
51yPatient is a 51y old  Male who presents with a chief complaint of foot ulcer (06 Aug 2021 11:45)      Interval history:  Seen in vascular lab  doing well  no new complaints  denies fevers, chills, pain      Antimicrobials:    ertapenem  IVPB      ertapenem  IVPB 1000 milliGRAM(s) IV Intermittent every 24 hours      MEDICATIONS  (STANDING):  aspirin enteric coated 81 daily  dextrose 40% Gel 15 once  dextrose 50% Injectable 25 once  dextrose 50% Injectable 12.5 once  dextrose 50% Injectable 25 once  glucagon  Injectable 1 once  heparin   Injectable 5000 every 8 hours  insulin glargine Injectable (LANTUS) 28 at bedtime  insulin lispro (ADMELOG) corrective regimen sliding scale  at bedtime  insulin lispro (ADMELOG) corrective regimen sliding scale  three times a day before meals  insulin lispro Injectable (ADMELOG) 10 three times a day before meals  lisinopril 10 daily      Vital Signs Last 24 Hrs  T(F): 98.5 (08-06-21 @ 08:35), Max: 98.7 (08-06-21 @ 00:56)  HR: 99 (08-06-21 @ 08:35)  BP: 102/66 (08-06-21 @ 08:35)  RR: 18 (08-06-21 @ 08:35)  SpO2: 95% (08-06-21 @ 08:35) (95% - 95%)  Wt(kg): --      PHYSICAL EXAM:  General: WN/WD NAD, Non-toxic  Neurology: A&Ox3, nonfocal  Respiratory: Clear to auscultation bilaterally  CV: RRR, S1S2, no murmurs, rubs or gallops  Abdominal: Soft, Non-tender, non-distended, normal bowel sounds  Extremities: No edema,  Line Sites: Clear  Skin: No rash  venous stasis skin changes LLE  s/p toe amps                              14.0   6.34  )-----------( 216      ( 05 Aug 2021 09:15 )             42.5   08-05    132<L>  |  95<L>  |  16  ----------------------------<  355<H>  3.9   |  25  |  0.53    Ca    8.8      05 Aug 2021 09:15    TPro  7.4  /  Alb  3.8  /  TBili  0.7  /  DBili  x   /  AST  15  /  ALT  16  /  AlkPhos  68  08-05      LIVER FUNCTIONS - ( 05 Aug 2021 09:15 )  Alb: 3.8 g/dL / Pro: 7.4 g/dL / ALK PHOS: 68 U/L / ALT: 16 U/L / AST: 15 U/L / GGT: x             RECENT CULTURES:    Culture - Abscess with Gram Stain (collected 04 Aug 2021 19:05)  Source: .Abscess left foot wound  Preliminary Report (06 Aug 2021 00:00):    Numerous Staphylococcus aureus    Numerous Streptococcus agalactiae (Group B) isolated    Group B streptococci are susceptible to ampicillin,    penicillin and cefazolin, but may be resistant to    erythromycin and clindamycin.    Recommendations for intrapartum prophylaxis for Group B    streptococci are penicillin or ampicillin.    Culture - Blood (collected 03 Aug 2021 17:29)  Source: .Blood Blood  Preliminary Report (04 Aug 2021 18:01):    No growth to date.    Culture - Blood (collected 03 Aug 2021 17:29)  Source: .Blood Blood  Preliminary Report (04 Aug 2021 18:01):    No growth to date.          Radiology:  < from: [facility] Duplex Lower Ext Vein Scan, Left (08.03.21 @ 16:50) >    IMPRESSION:  No evidence of left lower extremity deep venous thrombosis.    < end of copied text >  < from: Xray Foot AP + Lateral + Oblique, Left (08.03.21 @ 13:49) >   Three views of the left foot show no evidence of acute fracture. Thepatient has undergone amputation of the great toe and the head of the second metatarsal bone. The joint spaces are maintained.    IMPRESSION: Postoperative changes. No evidence of bony destruction.    < end of copied text >

## 2021-08-06 NOTE — PROGRESS NOTE ADULT - SUBJECTIVE AND OBJECTIVE BOX
Chief complaint  Patient is a 51y old  Male who presents with a chief complaint of foot ulcer (06 Aug 2021 12:53)   Review of systems  Patient in bed, looks comfortable, no hypoglycemic episodes.    Labs and Fingersticks  CAPILLARY BLOOD GLUCOSE      POCT Blood Glucose.: 286 mg/dL (06 Aug 2021 13:17)  POCT Blood Glucose.: 291 mg/dL (06 Aug 2021 07:52)  POCT Blood Glucose.: 262 mg/dL (05 Aug 2021 21:32)  POCT Blood Glucose.: 264 mg/dL (05 Aug 2021 17:04)      Anion Gap, Serum: 12 (08-05 @ 09:15)      Calcium, Total Serum: 8.8 (08-05 @ 09:15)  Albumin, Serum: 3.8 (08-05 @ 09:15)    Alanine Aminotransferase (ALT/SGPT): 16 (08-05 @ 09:15)  Alkaline Phosphatase, Serum: 68 (08-05 @ 09:15)  Aspartate Aminotransferase (AST/SGOT): 15 (08-05 @ 09:15)        08-05    132<L>  |  95<L>  |  16  ----------------------------<  355<H>  3.9   |  25  |  0.53    Ca    8.8      05 Aug 2021 09:15    TPro  7.4  /  Alb  3.8  /  TBili  0.7  /  DBili  x   /  AST  15  /  ALT  16  /  AlkPhos  68  08-05                        14.0   6.34  )-----------( 216      ( 05 Aug 2021 09:15 )             42.5     Medications  MEDICATIONS  (STANDING):  aspirin enteric coated 81 milliGRAM(s) Oral daily  dextrose 40% Gel 15 Gram(s) Oral once  dextrose 5%. 1000 milliLiter(s) (50 mL/Hr) IV Continuous <Continuous>  dextrose 5%. 1000 milliLiter(s) (100 mL/Hr) IV Continuous <Continuous>  dextrose 50% Injectable 25 Gram(s) IV Push once  dextrose 50% Injectable 12.5 Gram(s) IV Push once  dextrose 50% Injectable 25 Gram(s) IV Push once  ertapenem  IVPB      ertapenem  IVPB 1000 milliGRAM(s) IV Intermittent every 24 hours  glucagon  Injectable 1 milliGRAM(s) IntraMuscular once  heparin   Injectable 5000 Unit(s) SubCutaneous every 8 hours  insulin glargine Injectable (LANTUS) 28 Unit(s) SubCutaneous at bedtime  insulin lispro (ADMELOG) corrective regimen sliding scale   SubCutaneous at bedtime  insulin lispro (ADMELOG) corrective regimen sliding scale   SubCutaneous three times a day before meals  insulin lispro Injectable (ADMELOG) 10 Unit(s) SubCutaneous three times a day before meals  lisinopril 10 milliGRAM(s) Oral daily  multivitamin 1 Tablet(s) Oral daily  mupirocin 2% Ointment 1 Application(s) Topical two times a day  zinc sulfate 220 milliGRAM(s) Oral daily      Physical Exam  General: Patient comfortable in bed  Vital Signs Last 12 Hrs  T(F): 98.5 (08-06-21 @ 08:35), Max: 98.5 (08-06-21 @ 08:35)  HR: 99 (08-06-21 @ 08:35) (99 - 99)  BP: 102/66 (08-06-21 @ 08:35) (102/66 - 110/67)  BP(mean): --  RR: 18 (08-06-21 @ 08:35) (18 - 18)  SpO2: 95% (08-06-21 @ 08:35) (95% - 95%)  Neck: No palpable thyroid nodules.  CVS: S1S2, No murmurs  Respiratory: No wheezing, no crepitations  GI: Abdomen soft, bowel sounds positive  Musculoskeletal:  edema lower extremities.   Skin: No skin rashes, no ecchymosis    Diagnostics             Chief complaint  Patient is a 51y old  Male who presents with a chief complaint of foot ulcer (06 Aug 2021 12:53)   Review of systems  Patient in bed, looks comfortable, no hypoglycemic episodes.    Labs and Fingersticks  CAPILLARY BLOOD GLUCOSE      POCT Blood Glucose.: 286 mg/dL (06 Aug 2021 13:17)  POCT Blood Glucose.: 291 mg/dL (06 Aug 2021 07:52)  POCT Blood Glucose.: 262 mg/dL (05 Aug 2021 21:32)  POCT Blood Glucose.: 264 mg/dL (05 Aug 2021 17:04)      Anion Gap, Serum: 12 (08-05 @ 09:15)      Calcium, Total Serum: 8.8 (08-05 @ 09:15)  Albumin, Serum: 3.8 (08-05 @ 09:15)    Alanine Aminotransferase (ALT/SGPT): 16 (08-05 @ 09:15)  Alkaline Phosphatase, Serum: 68 (08-05 @ 09:15)  Aspartate Aminotransferase (AST/SGOT): 15 (08-05 @ 09:15)        08-05    132<L>  |  95<L>  |  16  ----------------------------<  355<H>  3.9   |  25  |  0.53    Ca    8.8      05 Aug 2021 09:15    TPro  7.4  /  Alb  3.8  /  TBili  0.7  /  DBili  x   /  AST  15  /  ALT  16  /  AlkPhos  68  08-05                        14.0   6.34  )-----------( 216      ( 05 Aug 2021 09:15 )             42.5     Medications  MEDICATIONS  (STANDING):  aspirin enteric coated 81 milliGRAM(s) Oral daily  dextrose 40% Gel 15 Gram(s) Oral once  dextrose 5%. 1000 milliLiter(s) (50 mL/Hr) IV Continuous <Continuous>  dextrose 5%. 1000 milliLiter(s) (100 mL/Hr) IV Continuous <Continuous>  dextrose 50% Injectable 25 Gram(s) IV Push once  dextrose 50% Injectable 12.5 Gram(s) IV Push once  dextrose 50% Injectable 25 Gram(s) IV Push once  ertapenem  IVPB      ertapenem  IVPB 1000 milliGRAM(s) IV Intermittent every 24 hours  glucagon  Injectable 1 milliGRAM(s) IntraMuscular once  heparin   Injectable 5000 Unit(s) SubCutaneous every 8 hours  insulin glargine Injectable (LANTUS) 28 Unit(s) SubCutaneous at bedtime  insulin lispro (ADMELOG) corrective regimen sliding scale   SubCutaneous at bedtime  insulin lispro (ADMELOG) corrective regimen sliding scale   SubCutaneous three times a day before meals  insulin lispro Injectable (ADMELOG) 10 Unit(s) SubCutaneous three times a day before meals  lisinopril 10 milliGRAM(s) Oral daily  multivitamin 1 Tablet(s) Oral daily  mupirocin 2% Ointment 1 Application(s) Topical two times a day  zinc sulfate 220 milliGRAM(s) Oral daily      Physical Exam  General: Patient comfortable in bed  Vital Signs Last 12 Hrs  T(F): 98.5 (08-06-21 @ 08:35), Max: 98.5 (08-06-21 @ 08:35)  HR: 99 (08-06-21 @ 08:35) (99 - 99)  BP: 102/66 (08-06-21 @ 08:35) (102/66 - 110/67)  BP(mean): --  RR: 18 (08-06-21 @ 08:35) (18 - 18)  SpO2: 95% (08-06-21 @ 08:35) (95% - 95%)  Neck: No palpable thyroid nodules.  CVS: S1S2, No murmurs  Respiratory: No wheezing, no crepitations  GI: Abdomen soft, bowel sounds positive  Musculoskeletal:  edema lower extremities.   Skin: No skin rashes, no ecchymosis    Diagnostics

## 2021-08-06 NOTE — PROGRESS NOTE ADULT - ASSESSMENT
52 y/o M MPH HTN HLD, poorly controlled DM (8/4: Hgb A1C = 12.6) on insulin, morbid obesity (BMI = 50.7) diabetic foot ulcer s/p multiple debridements and L great toe amputation on 1/20 admitted 8/3/21 with painful left plantar ulcer.  Culture positive for staph aureus and grp B strep. PCN allergy but tolerating ertapenem.       Problems  plantar ulcer  poorly controlled DM  morbid obesity  likely PAD  PCN allergy      There is limited role for antibiotics. Local wound care, off loading may help.    Suggest  Ertapenem ordered - tolerating.  F/up culture  Wound care  glycemic control  HIV screen pending  MRSA PCR swab in lab  If wound culture with MRSA will need to add vanco IV.  continue local care    Podiatry follow up    Please call ID service this weekend x [phone]  Ree Gómez MD  [phone] (pager)  [phone] (office)

## 2021-08-06 NOTE — PROGRESS NOTE ADULT - SUBJECTIVE AND OBJECTIVE BOX
DATE OF SERVICE: 08-06-21 @ 11:45    Patient is a 51y old  Male who presents with a chief complaint of foot ulcer (05 Aug 2021 13:33)      SUBJECTIVE / OVERNIGHT EVENTS:  No chest pain. No shortness of breath. No complaints. No events overnight.     MEDICATIONS  (STANDING):  aspirin enteric coated 81 milliGRAM(s) Oral daily  dextrose 40% Gel 15 Gram(s) Oral once  dextrose 5%. 1000 milliLiter(s) (50 mL/Hr) IV Continuous <Continuous>  dextrose 5%. 1000 milliLiter(s) (100 mL/Hr) IV Continuous <Continuous>  dextrose 50% Injectable 25 Gram(s) IV Push once  dextrose 50% Injectable 12.5 Gram(s) IV Push once  dextrose 50% Injectable 25 Gram(s) IV Push once  ertapenem  IVPB      ertapenem  IVPB 1000 milliGRAM(s) IV Intermittent every 24 hours  glucagon  Injectable 1 milliGRAM(s) IntraMuscular once  heparin   Injectable 5000 Unit(s) SubCutaneous every 8 hours  insulin glargine Injectable (LANTUS) 20 Unit(s) SubCutaneous at bedtime  insulin lispro (ADMELOG) corrective regimen sliding scale   SubCutaneous at bedtime  insulin lispro (ADMELOG) corrective regimen sliding scale   SubCutaneous three times a day before meals  insulin lispro Injectable (ADMELOG) 7 Unit(s) SubCutaneous three times a day before meals  lisinopril 10 milliGRAM(s) Oral daily  multivitamin 1 Tablet(s) Oral daily  mupirocin 2% Ointment 1 Application(s) Topical two times a day  zinc sulfate 220 milliGRAM(s) Oral daily    MEDICATIONS  (PRN):      Vital Signs Last 24 Hrs  T(C): 36.9 (06 Aug 2021 08:35), Max: 37.1 (06 Aug 2021 00:56)  T(F): 98.5 (06 Aug 2021 08:35), Max: 98.7 (06 Aug 2021 00:56)  HR: 99 (06 Aug 2021 08:35) (82 - 99)  BP: 102/66 (06 Aug 2021 08:35) (102/66 - 120/69)  BP(mean): --  RR: 18 (06 Aug 2021 08:35) (18 - 18)  SpO2: 95% (06 Aug 2021 08:35) (95% - 95%)  CAPILLARY BLOOD GLUCOSE      POCT Blood Glucose.: 291 mg/dL (06 Aug 2021 07:52)  POCT Blood Glucose.: 262 mg/dL (05 Aug 2021 21:32)  POCT Blood Glucose.: 264 mg/dL (05 Aug 2021 17:04)  POCT Blood Glucose.: 271 mg/dL (05 Aug 2021 11:46)    I&O's Summary    05 Aug 2021 07:01  -  06 Aug 2021 07:00  --------------------------------------------------------  IN: 770 mL / OUT: 0 mL / NET: 770 mL        PHYSICAL EXAM:  GENERAL: NAD, well-developed  HEAD:  Atraumatic, Normocephalic  EYES: EOMI, PERRLA, conjunctiva and sclera clear  NECK: Supple, No JVD  CHEST/LUNG: Clear to auscultation bilaterally; No wheeze  HEART: Regular rate and rhythm; No murmurs, rubs, or gallops  ABDOMEN: Soft, Nontender, Nondistended; Bowel sounds present  EXTREMITIES:  2+ Peripheral Pulses, No clubbing, cyanosis, or edema  PSYCH: AAOx3  NEUROLOGY: non-focal  SKIN: No rashes or lesions    LABS:                        14.0   6.34  )-----------( 216      ( 05 Aug 2021 09:15 )             42.5     08-05    132<L>  |  95<L>  |  16  ----------------------------<  355<H>  3.9   |  25  |  0.53    Ca    8.8      05 Aug 2021 09:15    TPro  7.4  /  Alb  3.8  /  TBili  0.7  /  DBili  x   /  AST  15  /  ALT  16  /  AlkPhos  68  08-05              RADIOLOGY & ADDITIONAL TESTS:    Imaging Personally Reviewed:    Consultant(s) Notes Reviewed:      Care Discussed with Consultants/Other Providers:

## 2021-08-06 NOTE — PROGRESS NOTE ADULT - ASSESSMENT
Assessment  DMT2: 51y Male with DM T2 with hyperglycemia, admitted with foot wound, A1C 12.6%, was on oral meds and insulin at home, likely noncompliant, now on basal bolus insulin, increased dose yesterday, blood sugars still running high and not at target, no hypoglycemic episode, eating meals.  Foot Ulcer: on medications, stable, monitored.  HTN: Controlled,  on antihypertensive medications.  Obesity: No strict exercise routines, not on any weight loss program, neither on low calorie diet.      Redd Muro MD  Cell: [phone]  Office: [phone]       Assessment  DMT2: 51y Male with DM T2 with hyperglycemia, admitted with foot wound, A1C 12.6%, was on oral meds and insulin at home, likely noncompliant, now on basal bolus insulin, increased dose yesterday, blood sugars still running high and not at  target, no hypoglycemic episode, eating meals.  Foot Ulcer: on medications, stable, monitored.  HTN: Controlled,  on antihypertensive medications.  Obesity: No strict exercise routines, not on any weight loss program, neither on low calorie diet.      Redd Muro MD  Cell: [phone]  Office: [phone]

## 2021-08-06 NOTE — PROGRESS NOTE ADULT - ASSESSMENT
50 y/o M MPH HTN HLD DM on insulin, diabetic foot ulcer s/p multiple debridements and L great toe amputation (NUMC) presents to ED c/o worsening redness, pain, and swelling to L foot and ankle x3 days assoc w/ chills 2 days ago. Denies fevers, increased drainage, bleeding from the wound. Denies recent abx.    diabetic foot ulcer  - ID called  - abx as per ID  - esr. crp elevated  - podiatry consult following    htn  - c/w home meds    uncontrolled diabetes  - fs qid  - hgb a1c 12.6  - insulin as per endo  - endo consult following    dvt px  - heparin

## 2021-08-07 LAB
GLUCOSE BLDC GLUCOMTR-MCNC: 257 MG/DL — HIGH (ref 70–99)
GLUCOSE BLDC GLUCOMTR-MCNC: 267 MG/DL — HIGH (ref 70–99)
GLUCOSE BLDC GLUCOMTR-MCNC: 281 MG/DL — HIGH (ref 70–99)
GLUCOSE BLDC GLUCOMTR-MCNC: 318 MG/DL — HIGH (ref 70–99)

## 2021-08-07 PROCEDURE — 99222 1ST HOSP IP/OBS MODERATE 55: CPT

## 2021-08-07 RX ORDER — CHLORHEXIDINE GLUCONATE 213 G/1000ML
1 SOLUTION TOPICAL DAILY
Refills: 0 | Status: DISCONTINUED | OUTPATIENT
Start: 2021-08-07 | End: 2021-08-09

## 2021-08-07 RX ORDER — INSULIN LISPRO 100/ML
14 VIAL (ML) SUBCUTANEOUS
Refills: 0 | Status: DISCONTINUED | OUTPATIENT
Start: 2021-08-07 | End: 2021-08-08

## 2021-08-07 RX ORDER — INSULIN GLARGINE 100 [IU]/ML
36 INJECTION, SOLUTION SUBCUTANEOUS AT BEDTIME
Refills: 0 | Status: DISCONTINUED | OUTPATIENT
Start: 2021-08-07 | End: 2021-08-08

## 2021-08-07 RX ADMIN — Medication 14 UNIT(S): at 11:58

## 2021-08-07 RX ADMIN — Medication 1 APPLICATION(S): at 18:30

## 2021-08-07 RX ADMIN — ERTAPENEM SODIUM 120 MILLIGRAM(S): 1 INJECTION, POWDER, LYOPHILIZED, FOR SOLUTION INTRAMUSCULAR; INTRAVENOUS at 13:01

## 2021-08-07 RX ADMIN — Medication 4: at 07:40

## 2021-08-07 RX ADMIN — Medication 1: at 22:08

## 2021-08-07 RX ADMIN — Medication 3: at 11:58

## 2021-08-07 RX ADMIN — ZINC SULFATE TAB 220 MG (50 MG ZINC EQUIVALENT) 220 MILLIGRAM(S): 220 (50 ZN) TAB at 11:56

## 2021-08-07 RX ADMIN — Medication 10 UNIT(S): at 07:40

## 2021-08-07 RX ADMIN — Medication 1 TABLET(S): at 11:56

## 2021-08-07 RX ADMIN — MUPIROCIN 1 APPLICATION(S): 20 OINTMENT TOPICAL at 22:09

## 2021-08-07 RX ADMIN — HEPARIN SODIUM 5000 UNIT(S): 5000 INJECTION INTRAVENOUS; SUBCUTANEOUS at 13:00

## 2021-08-07 RX ADMIN — LISINOPRIL 10 MILLIGRAM(S): 2.5 TABLET ORAL at 06:10

## 2021-08-07 RX ADMIN — MUPIROCIN 1 APPLICATION(S): 20 OINTMENT TOPICAL at 10:06

## 2021-08-07 RX ADMIN — INSULIN GLARGINE 36 UNIT(S): 100 INJECTION, SOLUTION SUBCUTANEOUS at 22:08

## 2021-08-07 RX ADMIN — HEPARIN SODIUM 5000 UNIT(S): 5000 INJECTION INTRAVENOUS; SUBCUTANEOUS at 06:10

## 2021-08-07 RX ADMIN — Medication 3: at 17:06

## 2021-08-07 RX ADMIN — Medication 81 MILLIGRAM(S): at 11:56

## 2021-08-07 RX ADMIN — Medication 14 UNIT(S): at 17:06

## 2021-08-07 RX ADMIN — HEPARIN SODIUM 5000 UNIT(S): 5000 INJECTION INTRAVENOUS; SUBCUTANEOUS at 22:09

## 2021-08-07 NOTE — CONSULT NOTE ADULT - ASSESSMENT
Phimosis 51y Male who admitted for management of foot ulcer, c/o 5 days of penile foreskin pain and swelling, and inability to   retract foreskin to clean. Suggestive of phimosis     --No acute urologic intervention   --Recommend treatment w/ clomitrazole 1% twice daily to affected area. Can consider adding on hydrocortisone ointment as needed   --Record strict I/O to ensure pt is voiding   --Can f/u as outpatient for further discussion of phimosis management

## 2021-08-07 NOTE — PROGRESS NOTE ADULT - SUBJECTIVE AND OBJECTIVE BOX
DATE OF SERVICE: 08-07-21 @ 10:23    Patient is a 51y old  Male who presents with a chief complaint of foot ulcer (06 Aug 2021 14:21)      SUBJECTIVE / OVERNIGHT EVENTS:  No chest pain. No shortness of breath.  No events overnight. c/o penile pain    MEDICATIONS  (STANDING):  aspirin enteric coated 81 milliGRAM(s) Oral daily  dextrose 40% Gel 15 Gram(s) Oral once  dextrose 5%. 1000 milliLiter(s) (50 mL/Hr) IV Continuous <Continuous>  dextrose 5%. 1000 milliLiter(s) (100 mL/Hr) IV Continuous <Continuous>  dextrose 50% Injectable 25 Gram(s) IV Push once  dextrose 50% Injectable 12.5 Gram(s) IV Push once  dextrose 50% Injectable 25 Gram(s) IV Push once  ertapenem  IVPB      ertapenem  IVPB 1000 milliGRAM(s) IV Intermittent every 24 hours  glucagon  Injectable 1 milliGRAM(s) IntraMuscular once  heparin   Injectable 5000 Unit(s) SubCutaneous every 8 hours  insulin glargine Injectable (LANTUS) 28 Unit(s) SubCutaneous at bedtime  insulin lispro (ADMELOG) corrective regimen sliding scale   SubCutaneous at bedtime  insulin lispro (ADMELOG) corrective regimen sliding scale   SubCutaneous three times a day before meals  insulin lispro Injectable (ADMELOG) 10 Unit(s) SubCutaneous three times a day before meals  lisinopril 10 milliGRAM(s) Oral daily  multivitamin 1 Tablet(s) Oral daily  mupirocin 2% Ointment 1 Application(s) Topical two times a day  zinc sulfate 220 milliGRAM(s) Oral daily    MEDICATIONS  (PRN):      Vital Signs Last 24 Hrs  T(C): 36.8 (07 Aug 2021 08:45), Max: 37.1 (06 Aug 2021 15:24)  T(F): 98.2 (07 Aug 2021 08:45), Max: 98.7 (06 Aug 2021 15:24)  HR: 94 (07 Aug 2021 08:45) (94 - 100)  BP: 95/67 (07 Aug 2021 08:45) (95/67 - 128/80)  BP(mean): --  RR: 18 (07 Aug 2021 08:45) (18 - 18)  SpO2: 95% (07 Aug 2021 08:45) (95% - 96%)  CAPILLARY BLOOD GLUCOSE      POCT Blood Glucose.: 318 mg/dL (07 Aug 2021 07:35)  POCT Blood Glucose.: 228 mg/dL (06 Aug 2021 21:07)  POCT Blood Glucose.: 210 mg/dL (06 Aug 2021 16:55)  POCT Blood Glucose.: 286 mg/dL (06 Aug 2021 13:17)    I&O's Summary    06 Aug 2021 07:01  -  07 Aug 2021 07:00  --------------------------------------------------------  IN: 2370 mL / OUT: 1000 mL / NET: 1370 mL        PHYSICAL EXAM:  GENERAL: NAD, well-developed  HEAD:  Atraumatic, Normocephalic  EYES: EOMI, PERRLA, conjunctiva and sclera clear  NECK: Supple, No JVD  CHEST/LUNG: Clear to auscultation bilaterally; No wheeze  HEART: Regular rate and rhythm; No murmurs, rubs, or gallops  ABDOMEN: Soft, Nontender, Nondistended; Bowel sounds present  : phimosis  EXTREMITIES:  2+ Peripheral Pulses, No clubbing, cyanosis, or edema  PSYCH: AAOx3  NEUROLOGY: non-focal  SKIN: No rashes or lesions    LABS:                    RADIOLOGY & ADDITIONAL TESTS:    Imaging Personally Reviewed:    Consultant(s) Notes Reviewed:      Care Discussed with Consultants/Other Providers:

## 2021-08-07 NOTE — PROGRESS NOTE ADULT - ASSESSMENT
50 y/o M MPH HTN HLD DM on insulin, diabetic foot ulcer s/p multiple debridements and L great toe amputation (NUMC) presents to ED c/o worsening redness, pain, and swelling to L foot and ankle x3 days assoc w/ chills 2 days ago. Denies fevers, increased drainage, bleeding from the wound. Denies recent abx.    diabetic foot ulcer  - ID called  - abx as per ID  - esr. crp elevated  - podiatry consult following    phimosis  - yrology consult    htn  - c/w home meds    uncontrolled diabetes  - fs qid  - hgb a1c 12.6  - insulin as per endo  - endo consult following    dvt px  - heparin

## 2021-08-07 NOTE — PROGRESS NOTE ADULT - SUBJECTIVE AND OBJECTIVE BOX
Chief complaint    Patient is a 51y old  Male who presents with a chief complaint of foot ulcer (07 Aug 2021 10:23)   Review of systems  Patient in bed, appears comfortable.    Labs and Fingersticks  CAPILLARY BLOOD GLUCOSE      POCT Blood Glucose.: 318 mg/dL (07 Aug 2021 07:35)  POCT Blood Glucose.: 228 mg/dL (06 Aug 2021 21:07)  POCT Blood Glucose.: 210 mg/dL (06 Aug 2021 16:55)  POCT Blood Glucose.: 286 mg/dL (06 Aug 2021 13:17)                        Medications  MEDICATIONS  (STANDING):  aspirin enteric coated 81 milliGRAM(s) Oral daily  dextrose 40% Gel 15 Gram(s) Oral once  dextrose 5%. 1000 milliLiter(s) (50 mL/Hr) IV Continuous <Continuous>  dextrose 5%. 1000 milliLiter(s) (100 mL/Hr) IV Continuous <Continuous>  dextrose 50% Injectable 25 Gram(s) IV Push once  dextrose 50% Injectable 12.5 Gram(s) IV Push once  dextrose 50% Injectable 25 Gram(s) IV Push once  ertapenem  IVPB      ertapenem  IVPB 1000 milliGRAM(s) IV Intermittent every 24 hours  glucagon  Injectable 1 milliGRAM(s) IntraMuscular once  heparin   Injectable 5000 Unit(s) SubCutaneous every 8 hours  insulin glargine Injectable (LANTUS) 36 Unit(s) SubCutaneous at bedtime  insulin lispro (ADMELOG) corrective regimen sliding scale   SubCutaneous three times a day before meals  insulin lispro (ADMELOG) corrective regimen sliding scale   SubCutaneous at bedtime  insulin lispro Injectable (ADMELOG) 14 Unit(s) SubCutaneous three times a day before meals  lisinopril 10 milliGRAM(s) Oral daily  multivitamin 1 Tablet(s) Oral daily  mupirocin 2% Ointment 1 Application(s) Topical two times a day  zinc sulfate 220 milliGRAM(s) Oral daily      Physical Exam  General: Patient comfortable in bed  Vital Signs Last 12 Hrs  T(F): 98.2 (08-07-21 @ 08:45), Max: 98.4 (08-07-21 @ 00:09)  HR: 94 (08-07-21 @ 08:45) (94 - 100)  BP: 95/67 (08-07-21 @ 08:45) (95/67 - 105/67)  BP(mean): --  RR: 18 (08-07-21 @ 08:45) (18 - 18)  SpO2: 95% (08-07-21 @ 08:45) (95% - 96%)  Neck: No palpable thyroid nodules.  CVS: S1S2, No murmurs  Respiratory: No wheezing, no crepitations  GI: Abdomen soft, bowel sounds positive  Musculoskeletal:  edema lower extremities.     Diagnostics

## 2021-08-07 NOTE — CONSULT NOTE ADULT - SUBJECTIVE AND OBJECTIVE BOX
HPI:  Patient is a 51y Male who admitted for management of foot ulcer, c/o 5 days of penile foreskin pain and swelling, and inability to   retract foreskin to clean.  He denies any discharge or bleeding, and has never had this happen before.  He does not see a urologist.  He is voiding  without complaints, no dysuria hemturia, urgency or frequency.       PAST MEDICAL & SURGICAL HISTORY:  HTN (hypertension)    HLD (hyperlipidemia)    DM (diabetes mellitus)    Great toe amputee      MEDICATIONS  (STANDING):  aspirin enteric coated 81 milliGRAM(s) Oral daily  dextrose 40% Gel 15 Gram(s) Oral once  dextrose 5%. 1000 milliLiter(s) (50 mL/Hr) IV Continuous <Continuous>  dextrose 5%. 1000 milliLiter(s) (100 mL/Hr) IV Continuous <Continuous>  dextrose 50% Injectable 25 Gram(s) IV Push once  dextrose 50% Injectable 12.5 Gram(s) IV Push once  dextrose 50% Injectable 25 Gram(s) IV Push once  ertapenem  IVPB      ertapenem  IVPB 1000 milliGRAM(s) IV Intermittent every 24 hours  glucagon  Injectable 1 milliGRAM(s) IntraMuscular once  heparin   Injectable 5000 Unit(s) SubCutaneous every 8 hours  insulin glargine Injectable (LANTUS) 36 Unit(s) SubCutaneous at bedtime  insulin lispro (ADMELOG) corrective regimen sliding scale   SubCutaneous at bedtime  insulin lispro (ADMELOG) corrective regimen sliding scale   SubCutaneous three times a day before meals  insulin lispro Injectable (ADMELOG) 14 Unit(s) SubCutaneous three times a day before meals  lisinopril 10 milliGRAM(s) Oral daily  multivitamin 1 Tablet(s) Oral daily  mupirocin 2% Ointment 1 Application(s) Topical two times a day  zinc sulfate 220 milliGRAM(s) Oral daily    MEDICATIONS  (PRN):    FAMILY HISTORY: non-contributory    Allergies    penicillin (Hives)  penicillin (Rash)    Intolerances      SOCIAL HISTORY:   Tobacco hx: unknown    REVIEW OF SYSTEMS: Pertinent positives and negatives as stated in HPI, otherwise negative    Vital signs  T(C): 36.8 (08-07-21 @ 08:45), Max: 37.1 (08-06-21 @ 15:24)  HR: 94 (08-07-21 @ 08:45)  BP: 95/67 (08-07-21 @ 08:45)  SpO2: 95% (08-07-21 @ 08:45)  Wt(kg): --        Physical Exam  Gen: NAD  Abd: Soft, NT, ND  : Uncircumcised no lesions. + phimosis, + tenderness   No discharge or blood at urethral meatus.  Testes descended bilaterally. Nontender.    MSK: + LE edema

## 2021-08-08 LAB
CULTURE RESULTS: SIGNIFICANT CHANGE UP
CULTURE RESULTS: SIGNIFICANT CHANGE UP
GLUCOSE BLDC GLUCOMTR-MCNC: 249 MG/DL — HIGH (ref 70–99)
GLUCOSE BLDC GLUCOMTR-MCNC: 257 MG/DL — HIGH (ref 70–99)
GLUCOSE BLDC GLUCOMTR-MCNC: 268 MG/DL — HIGH (ref 70–99)
GLUCOSE BLDC GLUCOMTR-MCNC: 307 MG/DL — HIGH (ref 70–99)
SPECIMEN SOURCE: SIGNIFICANT CHANGE UP
SPECIMEN SOURCE: SIGNIFICANT CHANGE UP

## 2021-08-08 PROCEDURE — 99232 SBSQ HOSP IP/OBS MODERATE 35: CPT

## 2021-08-08 RX ORDER — INSULIN GLARGINE 100 [IU]/ML
40 INJECTION, SOLUTION SUBCUTANEOUS AT BEDTIME
Refills: 0 | Status: DISCONTINUED | OUTPATIENT
Start: 2021-08-08 | End: 2021-08-09

## 2021-08-08 RX ORDER — INSULIN LISPRO 100/ML
17 VIAL (ML) SUBCUTANEOUS
Refills: 0 | Status: DISCONTINUED | OUTPATIENT
Start: 2021-08-08 | End: 2021-08-09

## 2021-08-08 RX ADMIN — Medication 17 UNIT(S): at 11:50

## 2021-08-08 RX ADMIN — HEPARIN SODIUM 5000 UNIT(S): 5000 INJECTION INTRAVENOUS; SUBCUTANEOUS at 05:57

## 2021-08-08 RX ADMIN — ERTAPENEM SODIUM 120 MILLIGRAM(S): 1 INJECTION, POWDER, LYOPHILIZED, FOR SOLUTION INTRAMUSCULAR; INTRAVENOUS at 13:02

## 2021-08-08 RX ADMIN — Medication 81 MILLIGRAM(S): at 11:10

## 2021-08-08 RX ADMIN — CHLORHEXIDINE GLUCONATE 1 APPLICATION(S): 213 SOLUTION TOPICAL at 08:30

## 2021-08-08 RX ADMIN — Medication 2: at 21:22

## 2021-08-08 RX ADMIN — INSULIN GLARGINE 40 UNIT(S): 100 INJECTION, SOLUTION SUBCUTANEOUS at 21:22

## 2021-08-08 RX ADMIN — HEPARIN SODIUM 5000 UNIT(S): 5000 INJECTION INTRAVENOUS; SUBCUTANEOUS at 13:01

## 2021-08-08 RX ADMIN — Medication 1 APPLICATION(S): at 21:28

## 2021-08-08 RX ADMIN — LISINOPRIL 10 MILLIGRAM(S): 2.5 TABLET ORAL at 06:00

## 2021-08-08 RX ADMIN — HEPARIN SODIUM 5000 UNIT(S): 5000 INJECTION INTRAVENOUS; SUBCUTANEOUS at 21:23

## 2021-08-08 RX ADMIN — MUPIROCIN 1 APPLICATION(S): 20 OINTMENT TOPICAL at 08:26

## 2021-08-08 RX ADMIN — MUPIROCIN 1 APPLICATION(S): 20 OINTMENT TOPICAL at 21:29

## 2021-08-08 RX ADMIN — Medication 1 APPLICATION(S): at 08:27

## 2021-08-08 RX ADMIN — Medication 14 UNIT(S): at 08:02

## 2021-08-08 RX ADMIN — Medication 17 UNIT(S): at 16:44

## 2021-08-08 RX ADMIN — Medication 3: at 08:02

## 2021-08-08 RX ADMIN — Medication 2: at 16:43

## 2021-08-08 RX ADMIN — Medication 1 TABLET(S): at 11:10

## 2021-08-08 RX ADMIN — Medication 3: at 11:50

## 2021-08-08 RX ADMIN — ZINC SULFATE TAB 220 MG (50 MG ZINC EQUIVALENT) 220 MILLIGRAM(S): 220 (50 ZN) TAB at 11:09

## 2021-08-08 NOTE — PROGRESS NOTE ADULT - ASSESSMENT
50 y/o M MPH HTN HLD DM on insulin, diabetic foot ulcer s/p multiple debridements and L great toe amputation (NUMC) presents to ED c/o worsening redness, pain, and swelling to L foot and ankle x3 days assoc w/ chills 2 days ago. Denies fevers, increased drainage, bleeding from the wound. Denies recent abx.    diabetic foot ulcer  - ID called  - abx as per ID  - esr. crp elevated  - podiatry consult following    phimosis  --No acute urologic intervention   -- treatment w/ clomitrazole 1% twice daily to affected area. Can consider adding on hydrocortisone ointment as needed   --Record strict I/O to ensure pt is voiding   --Can f/u as outpatient for further discussion of phimosis management     htn  - c/w home meds    uncontrolled diabetes  - fs qid  - hgb a1c 12.6  - insulin as per endo  - endo consult following    dvt px  - heparin    d/c planning    time 45 minutes spent

## 2021-08-08 NOTE — PROGRESS NOTE ADULT - ASSESSMENT
Assessment  DMT2: 51y Male with DM T2 with hyperglycemia, admitted with foot wound, A1C 12.6%, was on oral meds and insulin at home, admits noncompliance, now on basal bolus insulin, increased dose yesterday, blood sugars still running high and not at target, no hypoglycemic episode. Patient is eating meals with good appetite, appears alert and comfortable, denies acute complaints.  Foot Ulcer: on medications, stable, monitored.  HTN: Controlled,  on antihypertensive medications.  Obesity: No strict exercise routines, not on any weight loss program, neither on low calorie diet.      Redd Muro MD  Cell: [phone]  Office: [phone]       Assessment  DMT2: 51y Male with DM T2 with hyperglycemia, admitted with foot wound, A1C 12.6%, was on oral meds and insulin at home,  admits noncompliance, now on basal bolus insulin, increased dose yesterday, blood sugars still running high and not at target, no hypoglycemic episode. Patient is eating meals with good appetite, appears alert and comfortable, denies acute complaints.  Foot Ulcer: on medications, stable, monitored.  HTN: Controlled,  on antihypertensive medications.  Obesity: No strict exercise routines, not on any weight loss program, neither on low calorie diet.      Redd Muro MD  Cell: [phone]  Office: [phone]

## 2021-08-08 NOTE — PROGRESS NOTE ADULT - SUBJECTIVE AND OBJECTIVE BOX
Patient is a 51y old  Male who presents with a chief complaint of foot ulcer (07 Aug 2021 11:15)    Being followed by ID for diabetic foot infection    Interval history:  Remains afebrile  No acute events      ROS:  No cough, SOB, CP  No N/V/D./abd pain  No other complaints      Antimicrobials:    ertapenem  IVPB      ertapenem  IVPB 1000 milliGRAM(s) IV Intermittent every 24 hours      Vital Signs Last 24 Hrs  T(C): 36.8 (08-07-21 @ 23:59), Max: 36.8 (08-07-21 @ 08:45)  T(F): 98.3 (08-07-21 @ 23:59), Max: 98.3 (08-07-21 @ 16:19)  HR: 86 (08-07-21 @ 23:59) (86 - 94)  BP: 121/74 (08-07-21 @ 23:59) (95/67 - 121/74)  BP(mean): --  RR: 18 (08-07-21 @ 23:59) (18 - 18)  SpO2: 96% (08-07-21 @ 23:59) (95% - 96%)    Physical Exam:    Constitutional well preserved, NAD    HEENT PERRLA EOMI, No pallor or icterus    No oral exudate or erythema    Neck supple no LN    Chest Clear to auscultation    CVS S1 S2 WNl No murmur or rub or gallop    Abd soft BS normal No tenderness no masses    Ext Left foot ulcer    IV site no erythema tenderness or discharge    Joints no swelling or LOM    CNS AAO X 3 non focal    Lab Data:    Complete Blood Count + Automated Diff (08.05.21 @ 09:15)   WBC Count: 6.34 K/uL   RBC Count: 5.02 M/uL   Hemoglobin: 14.0 g/dL   Hematocrit: 42.5 %   Mean Cell Volume: 84.7 fl   Mean Cell Hemoglobin: 27.9 pg   Mean Cell Hemoglobin Conc: 32.9 gm/dL   Red Cell Distrib Width: 14.6 %   Platelet Count - Automated: 216 K/uL   Auto Neutrophil #: 3.99 K/uL   Auto Lymphocyte #: 1.84 K/uL   Auto Monocyte #: 0.34 K/uL   Auto Eosinophil #: 0.12 K/uL   Auto Basophil #: 0.02 K/uL   Auto Neutrophil %: 62.9: Differential percentages must be correlated with absolute numbers for   clinical significance. %   Auto Lymphocyte %: 29.0 %   Auto Monocyte %: 5.4 %   Auto Eosinophil %: 1.9 %   Auto Basophil %: 0.3 %   Auto Immature Granulocyte %: 0.5: (Includes meta, myelo and promyelocytes) %   Nucleated R              .Abscess left foot wound  08-04-21   Numerous Staphylococcus aureus  Numerous Streptococcus agalactiae (Group B) isolated  Group B streptococci are susceptible to ampicillin,  penicillin and cefazolin, but may be resistant to  erythromycin and clindamycin.  Recommendations for intrapartum prophylaxis for Group B  streptococci are penicillin or ampicillin.  Moderate Mixed Anaerobic Geneva "Susceptibilities not performed"  --  Staphylococcus aureus      .Blood Blood  08-03-21   No growth to date.  --  --      < from: [facility] Physiol Extremity Lower 3+ Level, BI (08.06.21 @ 12:48) >  EXAM:  PHYSIOL EXTREM LOW 3+ LEV BI                            PROCEDURE DATE:  08/06/2021            INTERPRETATION:  Clinical information: Nonsmoker, diabetic, hyperlipidemia, known peripheral arterial disease status post left first toe amputation. Presents with left lower extremity claudication and rest pain, nonhealing left foot wound, and nonpalpable distal pulses.    COMPARISON: None available.    TECHNIQUE: Lower extremity arterial Doppler study. Note that digital waveforms are unobtainable in the left foot due to wound dressings.    FINDINGS: Ankle brachial index measures 1.39 on the right and 1.22 on the left. No segmental arterial pressure gradient is detected. Toe brachial index measures 1.31 on the right. It is unobtainable on the left.    PVR waveforms are normal in amplitude and pulsatility throughout. Right digital PPG waveforms are strongly pulsatile.    IMPRESSION: No Doppler evidence of hemodynamically significant arterial flow limitation in either lower extremity.    < end of copied text >

## 2021-08-08 NOTE — PROGRESS NOTE ADULT - ASSESSMENT
50 y/o M MPH HTN HLD, poorly controlled DM (8/4: Hgb A1C = 12.6) on insulin, morbid obesity (BMI = 50.7) diabetic foot ulcer s/p multiple debridements and L great toe amputation on 1/20 admitted 8/3/21 with painful left plantar ulcer.  Culture positive for staph aureus and grp B strep. PCN allergy but tolerating ertapenem.       Problems  plantar ulcer  poorly controlled DM  morbid obesity    PCN allergy      There is limited role for antibiotics. Local wound care, off loading may help.    Suggest  Ertapenem ordered - tolerating.  F/up culture  Wound care  glycemic control  HIV screen negative  MRSA PCR swab negative  If wound culture with MRSA will need to add vanco IV.  continue local care    Podiatry follow up    Please call ID service this weekend x [phone]  Otoniel Saavedra MD  office [phone]

## 2021-08-08 NOTE — PROGRESS NOTE ADULT - SUBJECTIVE AND OBJECTIVE BOX
DATE OF SERVICE: 08-08-21 @ 12:12    Patient is a 51y old  Male who presents with a chief complaint of foot ulcer (08 Aug 2021 07:59)      SUBJECTIVE / OVERNIGHT EVENTS:  No chest pain. No shortness of breath. No complaints. No events overnight.     MEDICATIONS  (STANDING):  aspirin enteric coated 81 milliGRAM(s) Oral daily  chlorhexidine 2% Cloths 1 Application(s) Topical daily  clotrimazole 1% Cream 1 Application(s) Topical two times a day  dextrose 40% Gel 15 Gram(s) Oral once  dextrose 5%. 1000 milliLiter(s) (50 mL/Hr) IV Continuous <Continuous>  dextrose 5%. 1000 milliLiter(s) (100 mL/Hr) IV Continuous <Continuous>  dextrose 50% Injectable 25 Gram(s) IV Push once  dextrose 50% Injectable 12.5 Gram(s) IV Push once  dextrose 50% Injectable 25 Gram(s) IV Push once  ertapenem  IVPB      ertapenem  IVPB 1000 milliGRAM(s) IV Intermittent every 24 hours  glucagon  Injectable 1 milliGRAM(s) IntraMuscular once  heparin   Injectable 5000 Unit(s) SubCutaneous every 8 hours  insulin glargine Injectable (LANTUS) 40 Unit(s) SubCutaneous at bedtime  insulin lispro (ADMELOG) corrective regimen sliding scale   SubCutaneous at bedtime  insulin lispro (ADMELOG) corrective regimen sliding scale   SubCutaneous three times a day before meals  insulin lispro Injectable (ADMELOG) 17 Unit(s) SubCutaneous three times a day before meals  lisinopril 10 milliGRAM(s) Oral daily  multivitamin 1 Tablet(s) Oral daily  mupirocin 2% Ointment 1 Application(s) Topical two times a day  zinc sulfate 220 milliGRAM(s) Oral daily    MEDICATIONS  (PRN):      Vital Signs Last 24 Hrs  T(C): 36.7 (08 Aug 2021 09:03), Max: 36.8 (07 Aug 2021 16:19)  T(F): 98 (08 Aug 2021 09:03), Max: 98.3 (07 Aug 2021 16:19)  HR: 88 (08 Aug 2021 09:03) (86 - 90)  BP: 116/76 (08 Aug 2021 09:03) (102/64 - 121/74)  BP(mean): --  RR: 18 (08 Aug 2021 09:03) (18 - 18)  SpO2: 96% (08 Aug 2021 09:03) (96% - 96%)  CAPILLARY BLOOD GLUCOSE      POCT Blood Glucose.: 268 mg/dL (08 Aug 2021 11:44)  POCT Blood Glucose.: 257 mg/dL (08 Aug 2021 07:41)  POCT Blood Glucose.: 267 mg/dL (07 Aug 2021 21:10)  POCT Blood Glucose.: 257 mg/dL (07 Aug 2021 16:56)    I&O's Summary    07 Aug 2021 07:01  -  08 Aug 2021 07:00  --------------------------------------------------------  IN: 290 mL / OUT: 0 mL / NET: 290 mL        PHYSICAL EXAM:  GENERAL: NAD, well-developed  HEAD:  Atraumatic, Normocephalic  EYES: EOMI, PERRLA, conjunctiva and sclera clear  NECK: Supple, No JVD  CHEST/LUNG: Clear to auscultation bilaterally; No wheeze  HEART: Regular rate and rhythm; No murmurs, rubs, or gallops  ABDOMEN: Soft, Nontender, Nondistended; Bowel sounds present  EXTREMITIES:  2+ Peripheral Pulses, No clubbing, cyanosis, or edema  PSYCH: AAOx3  NEUROLOGY: non-focal  SKIN: No rashes or lesions    LABS:                    RADIOLOGY & ADDITIONAL TESTS:    Imaging Personally Reviewed:    Consultant(s) Notes Reviewed:      Care Discussed with Consultants/Other Providers:

## 2021-08-08 NOTE — PROGRESS NOTE ADULT - SUBJECTIVE AND OBJECTIVE BOX
Chief complaint  Patient is a 51y old  Male who presents with a chief complaint of foot ulcer (08 Aug 2021 12:11)   Review of systems  Patient awake in chair, looks comfortable, no hypoglycemic episodes.    Labs and Fingersticks  CAPILLARY BLOOD GLUCOSE      POCT Blood Glucose.: 268 mg/dL (08 Aug 2021 11:44)  POCT Blood Glucose.: 257 mg/dL (08 Aug 2021 07:41)  POCT Blood Glucose.: 267 mg/dL (07 Aug 2021 21:10)  POCT Blood Glucose.: 257 mg/dL (07 Aug 2021 16:56)    Medications  MEDICATIONS  (STANDING):  aspirin enteric coated 81 milliGRAM(s) Oral daily  chlorhexidine 2% Cloths 1 Application(s) Topical daily  clotrimazole 1% Cream 1 Application(s) Topical two times a day  dextrose 40% Gel 15 Gram(s) Oral once  dextrose 5%. 1000 milliLiter(s) (50 mL/Hr) IV Continuous <Continuous>  dextrose 5%. 1000 milliLiter(s) (100 mL/Hr) IV Continuous <Continuous>  dextrose 50% Injectable 25 Gram(s) IV Push once  dextrose 50% Injectable 12.5 Gram(s) IV Push once  dextrose 50% Injectable 25 Gram(s) IV Push once  ertapenem  IVPB      ertapenem  IVPB 1000 milliGRAM(s) IV Intermittent every 24 hours  glucagon  Injectable 1 milliGRAM(s) IntraMuscular once  heparin   Injectable 5000 Unit(s) SubCutaneous every 8 hours  insulin glargine Injectable (LANTUS) 40 Unit(s) SubCutaneous at bedtime  insulin lispro (ADMELOG) corrective regimen sliding scale   SubCutaneous at bedtime  insulin lispro (ADMELOG) corrective regimen sliding scale   SubCutaneous three times a day before meals  insulin lispro Injectable (ADMELOG) 17 Unit(s) SubCutaneous three times a day before meals  lisinopril 10 milliGRAM(s) Oral daily  multivitamin 1 Tablet(s) Oral daily  mupirocin 2% Ointment 1 Application(s) Topical two times a day  zinc sulfate 220 milliGRAM(s) Oral daily      Physical Exam  General: Patient comfortable in chair  Vital Signs Last 12 Hrs  T(F): 98 (08-08-21 @ 09:03), Max: 98 (08-08-21 @ 09:03)  HR: 88 (08-08-21 @ 09:03) (88 - 88)  BP: 116/76 (08-08-21 @ 09:03) (116/76 - 116/76)  BP(mean): --  RR: 18 (08-08-21 @ 09:03) (18 - 18)  SpO2: 96% (08-08-21 @ 09:03) (96% - 96%)  Neck: No palpable thyroid nodules.  CVS: S1S2, No murmurs  Respiratory: No wheezing, no crepitations  GI: Abdomen soft, bowel sounds positive  Musculoskeletal:  edema lower extremities.   Skin: No skin rashes, no ecchymosis    Diagnostics             Chief complaint  Patient is a 51y old  Male who presents with a chief complaint of foot ulcer (08 Aug 2021 12:11)   Review of systems  Patient awake in chair, looks comfortable, no hypoglycemic episodes.    Labs and Fingersticks  CAPILLARY BLOOD GLUCOSE    POCT Blood Glucose.: 268 mg/dL (08 Aug 2021 11:44)  POCT Blood Glucose.: 257 mg/dL (08 Aug 2021 07:41)  POCT Blood Glucose.: 267 mg/dL (07 Aug 2021 21:10)  POCT Blood Glucose.: 257 mg/dL (07 Aug 2021 16:56)    Medications  MEDICATIONS  (STANDING):  aspirin enteric coated 81 milliGRAM(s) Oral daily  chlorhexidine 2% Cloths 1 Application(s) Topical daily  clotrimazole 1% Cream 1 Application(s) Topical two times a day  dextrose 40% Gel 15 Gram(s) Oral once  dextrose 5%. 1000 milliLiter(s) (50 mL/Hr) IV Continuous <Continuous>  dextrose 5%. 1000 milliLiter(s) (100 mL/Hr) IV Continuous <Continuous>  dextrose 50% Injectable 25 Gram(s) IV Push once  dextrose 50% Injectable 12.5 Gram(s) IV Push once  dextrose 50% Injectable 25 Gram(s) IV Push once  ertapenem  IVPB      ertapenem  IVPB 1000 milliGRAM(s) IV Intermittent every 24 hours  glucagon  Injectable 1 milliGRAM(s) IntraMuscular once  heparin   Injectable 5000 Unit(s) SubCutaneous every 8 hours  insulin glargine Injectable (LANTUS) 40 Unit(s) SubCutaneous at bedtime  insulin lispro (ADMELOG) corrective regimen sliding scale   SubCutaneous at bedtime  insulin lispro (ADMELOG) corrective regimen sliding scale   SubCutaneous three times a day before meals  insulin lispro Injectable (ADMELOG) 17 Unit(s) SubCutaneous three times a day before meals  lisinopril 10 milliGRAM(s) Oral daily  multivitamin 1 Tablet(s) Oral daily  mupirocin 2% Ointment 1 Application(s) Topical two times a day  zinc sulfate 220 milliGRAM(s) Oral daily      Physical Exam  General: Patient comfortable in chair  Vital Signs Last 12 Hrs  T(F): 98 (08-08-21 @ 09:03), Max: 98 (08-08-21 @ 09:03)  HR: 88 (08-08-21 @ 09:03) (88 - 88)  BP: 116/76 (08-08-21 @ 09:03) (116/76 - 116/76)  BP(mean): --  RR: 18 (08-08-21 @ 09:03) (18 - 18)  SpO2: 96% (08-08-21 @ 09:03) (96% - 96%)  Neck: No palpable thyroid nodules.  CVS: S1S2, No murmurs  Respiratory: No wheezing, no crepitations  GI: Abdomen soft, bowel sounds positive  Musculoskeletal:  edema lower extremities.   Skin: No skin rashes, no ecchymosis    Diagnostics

## 2021-08-09 ENCOUNTER — TRANSCRIPTION ENCOUNTER (OUTPATIENT)
Age: 51
End: 2021-08-09

## 2021-08-09 VITALS
OXYGEN SATURATION: 100 % | SYSTOLIC BLOOD PRESSURE: 117 MMHG | DIASTOLIC BLOOD PRESSURE: 75 MMHG | RESPIRATION RATE: 18 BRPM | TEMPERATURE: 98 F | HEART RATE: 86 BPM

## 2021-08-09 LAB
GLUCOSE BLDC GLUCOMTR-MCNC: 223 MG/DL — HIGH (ref 70–99)
GLUCOSE BLDC GLUCOMTR-MCNC: 254 MG/DL — HIGH (ref 70–99)
GLUCOSE BLDC GLUCOMTR-MCNC: 284 MG/DL — HIGH (ref 70–99)

## 2021-08-09 PROCEDURE — 80048 BASIC METABOLIC PNL TOTAL CA: CPT

## 2021-08-09 PROCEDURE — 80053 COMPREHEN METABOLIC PANEL: CPT

## 2021-08-09 PROCEDURE — 73630 X-RAY EXAM OF FOOT: CPT

## 2021-08-09 PROCEDURE — 82803 BLOOD GASES ANY COMBINATION: CPT

## 2021-08-09 PROCEDURE — 87077 CULTURE AEROBIC IDENTIFY: CPT

## 2021-08-09 PROCEDURE — U0005: CPT

## 2021-08-09 PROCEDURE — 87075 CULTR BACTERIA EXCEPT BLOOD: CPT

## 2021-08-09 PROCEDURE — 87640 STAPH A DNA AMP PROBE: CPT

## 2021-08-09 PROCEDURE — 82435 ASSAY OF BLOOD CHLORIDE: CPT

## 2021-08-09 PROCEDURE — 84132 ASSAY OF SERUM POTASSIUM: CPT

## 2021-08-09 PROCEDURE — 83036 HEMOGLOBIN GLYCOSYLATED A1C: CPT

## 2021-08-09 PROCEDURE — 83735 ASSAY OF MAGNESIUM: CPT

## 2021-08-09 PROCEDURE — U0003: CPT

## 2021-08-09 PROCEDURE — 87040 BLOOD CULTURE FOR BACTERIA: CPT

## 2021-08-09 PROCEDURE — 86769 SARS-COV-2 COVID-19 ANTIBODY: CPT

## 2021-08-09 PROCEDURE — 87205 SMEAR GRAM STAIN: CPT

## 2021-08-09 PROCEDURE — 82607 VITAMIN B-12: CPT

## 2021-08-09 PROCEDURE — 84295 ASSAY OF SERUM SODIUM: CPT

## 2021-08-09 PROCEDURE — 83605 ASSAY OF LACTIC ACID: CPT

## 2021-08-09 PROCEDURE — 87641 MR-STAPH DNA AMP PROBE: CPT

## 2021-08-09 PROCEDURE — 87070 CULTURE OTHR SPECIMN AEROBIC: CPT

## 2021-08-09 PROCEDURE — 82746 ASSAY OF FOLIC ACID SERUM: CPT

## 2021-08-09 PROCEDURE — 85018 HEMOGLOBIN: CPT

## 2021-08-09 PROCEDURE — 84443 ASSAY THYROID STIM HORMONE: CPT

## 2021-08-09 PROCEDURE — 93971 EXTREMITY STUDY: CPT

## 2021-08-09 PROCEDURE — 99232 SBSQ HOSP IP/OBS MODERATE 35: CPT

## 2021-08-09 PROCEDURE — 97161 PT EVAL LOW COMPLEX 20 MIN: CPT

## 2021-08-09 PROCEDURE — 84100 ASSAY OF PHOSPHORUS: CPT

## 2021-08-09 PROCEDURE — 93923 UPR/LXTR ART STDY 3+ LVLS: CPT

## 2021-08-09 PROCEDURE — 87389 HIV-1 AG W/HIV-1&-2 AB AG IA: CPT

## 2021-08-09 PROCEDURE — 85014 HEMATOCRIT: CPT

## 2021-08-09 PROCEDURE — 82962 GLUCOSE BLOOD TEST: CPT

## 2021-08-09 PROCEDURE — 87186 SC STD MICRODIL/AGAR DIL: CPT

## 2021-08-09 PROCEDURE — 99231 SBSQ HOSP IP/OBS SF/LOW 25: CPT

## 2021-08-09 PROCEDURE — 85027 COMPLETE CBC AUTOMATED: CPT

## 2021-08-09 PROCEDURE — 99285 EMERGENCY DEPT VISIT HI MDM: CPT

## 2021-08-09 PROCEDURE — 86140 C-REACTIVE PROTEIN: CPT

## 2021-08-09 PROCEDURE — 85652 RBC SED RATE AUTOMATED: CPT

## 2021-08-09 PROCEDURE — 82330 ASSAY OF CALCIUM: CPT

## 2021-08-09 PROCEDURE — 82947 ASSAY GLUCOSE BLOOD QUANT: CPT

## 2021-08-09 PROCEDURE — 85025 COMPLETE CBC W/AUTO DIFF WBC: CPT

## 2021-08-09 RX ORDER — INSULIN LISPRO 100/ML
17 VIAL (ML) SUBCUTANEOUS
Qty: 0 | Refills: 0 | DISCHARGE

## 2021-08-09 RX ORDER — ISOPROPYL ALCOHOL, BENZOCAINE .7; .06 ML/ML; ML/ML
1 SWAB TOPICAL
Qty: 100 | Refills: 1
Start: 2021-08-09 | End: 2021-09-27

## 2021-08-09 RX ORDER — ASPIRIN/CALCIUM CARB/MAGNESIUM 324 MG
1 TABLET ORAL
Qty: 0 | Refills: 0 | DISCHARGE

## 2021-08-09 RX ORDER — LINEZOLID 600 MG/300ML
1 INJECTION, SOLUTION INTRAVENOUS
Qty: 20 | Refills: 0
Start: 2021-08-09 | End: 2021-08-18

## 2021-08-09 RX ORDER — INSULIN DEGLUDEC 100 U/ML
70 INJECTION, SOLUTION SUBCUTANEOUS
Qty: 0 | Refills: 0 | DISCHARGE

## 2021-08-09 RX ORDER — INSULIN ASPART 100 [IU]/ML
0 INJECTION, SOLUTION SUBCUTANEOUS
Qty: 0 | Refills: 0 | DISCHARGE

## 2021-08-09 RX ORDER — METFORMIN HYDROCHLORIDE 850 MG/1
1 TABLET ORAL
Qty: 0 | Refills: 0 | DISCHARGE

## 2021-08-09 RX ORDER — INSULIN GLARGINE 100 [IU]/ML
50 INJECTION, SOLUTION SUBCUTANEOUS AT BEDTIME
Refills: 0 | Status: DISCONTINUED | OUTPATIENT
Start: 2021-08-09 | End: 2021-08-09

## 2021-08-09 RX ORDER — LISINOPRIL 2.5 MG/1
1 TABLET ORAL
Qty: 0 | Refills: 0 | DISCHARGE

## 2021-08-09 RX ORDER — INSULIN DEGLUDEC 100 U/ML
0 INJECTION, SOLUTION SUBCUTANEOUS
Qty: 0 | Refills: 0 | DISCHARGE

## 2021-08-09 RX ORDER — MUPIROCIN 20 MG/G
1 OINTMENT TOPICAL
Qty: 60 | Refills: 0
Start: 2021-08-09 | End: 2021-09-07

## 2021-08-09 RX ORDER — INSULIN LISPRO 100/ML
27 VIAL (ML) SUBCUTANEOUS
Qty: 0 | Refills: 0 | DISCHARGE

## 2021-08-09 RX ORDER — INSULIN LISPRO 100/ML
20 VIAL (ML) SUBCUTANEOUS
Refills: 0 | Status: DISCONTINUED | OUTPATIENT
Start: 2021-08-09 | End: 2021-08-09

## 2021-08-09 RX ADMIN — Medication 1 APPLICATION(S): at 10:58

## 2021-08-09 RX ADMIN — Medication 3: at 17:22

## 2021-08-09 RX ADMIN — CHLORHEXIDINE GLUCONATE 1 APPLICATION(S): 213 SOLUTION TOPICAL at 10:57

## 2021-08-09 RX ADMIN — Medication 20 UNIT(S): at 12:22

## 2021-08-09 RX ADMIN — Medication 2: at 07:56

## 2021-08-09 RX ADMIN — LISINOPRIL 10 MILLIGRAM(S): 2.5 TABLET ORAL at 05:45

## 2021-08-09 RX ADMIN — MUPIROCIN 1 APPLICATION(S): 20 OINTMENT TOPICAL at 10:58

## 2021-08-09 RX ADMIN — ZINC SULFATE TAB 220 MG (50 MG ZINC EQUIVALENT) 220 MILLIGRAM(S): 220 (50 ZN) TAB at 10:58

## 2021-08-09 RX ADMIN — Medication 3: at 12:21

## 2021-08-09 RX ADMIN — HEPARIN SODIUM 5000 UNIT(S): 5000 INJECTION INTRAVENOUS; SUBCUTANEOUS at 05:45

## 2021-08-09 RX ADMIN — Medication 81 MILLIGRAM(S): at 10:57

## 2021-08-09 RX ADMIN — ERTAPENEM SODIUM 120 MILLIGRAM(S): 1 INJECTION, POWDER, LYOPHILIZED, FOR SOLUTION INTRAMUSCULAR; INTRAVENOUS at 14:45

## 2021-08-09 RX ADMIN — HEPARIN SODIUM 5000 UNIT(S): 5000 INJECTION INTRAVENOUS; SUBCUTANEOUS at 14:45

## 2021-08-09 RX ADMIN — Medication 20 UNIT(S): at 17:22

## 2021-08-09 RX ADMIN — Medication 1 TABLET(S): at 10:58

## 2021-08-09 RX ADMIN — Medication 17 UNIT(S): at 07:56

## 2021-08-09 NOTE — PROGRESS NOTE ADULT - SUBJECTIVE AND OBJECTIVE BOX
Patient is a 51y old  Male who presents with a chief complaint of foot ulcer (09 Aug 2021 15:37)       INTERVAL HPI/OVERNIGHT EVENTS:  Patient seen and evaluated at bedside.  Pt is resting comfortable in NAD. Denies N/V/F/C.      Allergies    penicillin (Hives)  penicillin (Rash)    Intolerances        Vital Signs Last 24 Hrs  T(C): 36.4 (09 Aug 2021 16:03), Max: 36.8 (08 Aug 2021 23:52)  T(F): 97.6 (09 Aug 2021 16:03), Max: 98.3 (08 Aug 2021 23:52)  HR: 86 (09 Aug 2021 16:03) (86 - 90)  BP: 117/75 (09 Aug 2021 16:03) (108/72 - 117/75)  BP(mean): --  RR: 18 (09 Aug 2021 16:03) (18 - 18)  SpO2: 100% (09 Aug 2021 16:03) (96% - 100%)    LABS:              CAPILLARY BLOOD GLUCOSE      POCT Blood Glucose.: 254 mg/dL (09 Aug 2021 17:11)  POCT Blood Glucose.: 284 mg/dL (09 Aug 2021 11:36)  POCT Blood Glucose.: 223 mg/dL (09 Aug 2021 07:47)      Lower Extremity Physical Exam:  Vascular: DP/PT 0/4 B/L, CFT <3 seconds B/L, Temperature gradient warm to cool B/L  Neuro: Epicritic sensation absent  to the level of forefoot b/l   Musculoskeletal/Ortho: s/p left foot partial hallux amputation   Skin: left foot submet 2/3 wound to subq, hyperkeratotic periwound, no drainage, no malodor, no fluctuance, no purulence; LLE erythema resolved

## 2021-08-09 NOTE — PROGRESS NOTE ADULT - PROBLEM SELECTOR PLAN 4
Overweight/Obesity: Patient counseled for weight loss, exercise, low calorie diet.

## 2021-08-09 NOTE — DISCHARGE NOTE PROVIDER - NSDCHHENCOUNTER_GEN_ALL_CORE
"Chief Complaint   Patient presents with     RECHECK     Post op - Holmium laser enucleation        Blood pressure 113/69, pulse 57, height 1.626 m (5' 4\"), weight 63.8 kg (140 lb 9.6 oz). Body mass index is 24.13 kg/(m^2).    Patient Active Problem List   Diagnosis     Elevated prostate specific antigen (PSA)     BPH (benign prostatic hypertrophy)     Advanced directives, counseling/discussion     Hyperlipidemia with target LDL less than 160     JULIO (obstructive sleep apnea)- moderate (AHI 28)     BPH (benign prostatic hyperplasia)       No Known Allergies    Current Outpatient Prescriptions   Medication Sig Dispense Refill     senna-docusate (SENOKOT-S;PERICOLACE) 8.6-50 MG per tablet Take 1-2 tablets by mouth 2 times daily as needed for constipation Take while on oral narcotics to prevent or treat constipation. 30 tablet 0     ibuprofen (ADVIL/MOTRIN) 200 MG tablet Take 200 mg by mouth every 6 hours as needed for mild pain Reported on 2/14/2017       order for DME Respironics REMSTAR 60 Series Auto CPAP 8-10cm H2O, [place] Fx nasal cushion, std       Cholecalciferol (VITAMIN D PO) Take 1 tablet by mouth daily Take one tablet daily       CIALIS 5 MG tablet Reported on 2/14/2017  12     triamcinolone (KENALOG) 0.1 % cream Apply topically as needed Reported on 2/14/2017       [DISCONTINUED] ORDER FOR DME Respironics REMSTAR 60 Series Auto CPAP 6-10cm H2O, [place] Fx nasal pillow mask w/medium pillows         Social History   Substance Use Topics     Smoking status: Never Smoker     Smokeless tobacco: Never Used     Alcohol use Yes      Comment: drink every few weeks       NATALIA Juarez  2/14/2017  9:57 AM       "
09-Aug-2021

## 2021-08-09 NOTE — PROGRESS NOTE ADULT - SUBJECTIVE AND OBJECTIVE BOX
Chief complaint  Patient is a 51y old  Male who presents with a chief complaint of foot ulcer (09 Aug 2021 13:19)   Review of systems  Patient in bed, looks comfortable, no hypoglycemic episodes.    Labs and Fingersticks  CAPILLARY BLOOD GLUCOSE      POCT Blood Glucose.: 284 mg/dL (09 Aug 2021 11:36)  POCT Blood Glucose.: 223 mg/dL (09 Aug 2021 07:47)  POCT Blood Glucose.: 307 mg/dL (08 Aug 2021 21:12)  POCT Blood Glucose.: 249 mg/dL (08 Aug 2021 16:31)                        Medications  MEDICATIONS  (STANDING):  aspirin enteric coated 81 milliGRAM(s) Oral daily  chlorhexidine 2% Cloths 1 Application(s) Topical daily  clotrimazole 1% Cream 1 Application(s) Topical two times a day  dextrose 40% Gel 15 Gram(s) Oral once  dextrose 5%. 1000 milliLiter(s) (50 mL/Hr) IV Continuous <Continuous>  dextrose 5%. 1000 milliLiter(s) (100 mL/Hr) IV Continuous <Continuous>  dextrose 50% Injectable 25 Gram(s) IV Push once  dextrose 50% Injectable 12.5 Gram(s) IV Push once  dextrose 50% Injectable 25 Gram(s) IV Push once  ertapenem  IVPB      ertapenem  IVPB 1000 milliGRAM(s) IV Intermittent every 24 hours  glucagon  Injectable 1 milliGRAM(s) IntraMuscular once  heparin   Injectable 5000 Unit(s) SubCutaneous every 8 hours  insulin glargine Injectable (LANTUS) 50 Unit(s) SubCutaneous at bedtime  insulin lispro (ADMELOG) corrective regimen sliding scale   SubCutaneous at bedtime  insulin lispro (ADMELOG) corrective regimen sliding scale   SubCutaneous three times a day before meals  insulin lispro Injectable (ADMELOG) 20 Unit(s) SubCutaneous three times a day before meals  lisinopril 10 milliGRAM(s) Oral daily  multivitamin 1 Tablet(s) Oral daily  mupirocin 2% Ointment 1 Application(s) Topical two times a day  zinc sulfate 220 milliGRAM(s) Oral daily      Physical Exam  General: Patient comfortable in bed  Vital Signs Last 12 Hrs  T(F): 98 (08-09-21 @ 08:49), Max: 98 (08-09-21 @ 08:49)  HR: 90 (08-09-21 @ 08:49) (90 - 90)  BP: 108/72 (08-09-21 @ 08:49) (108/72 - 108/72)  BP(mean): --  RR: 18 (08-09-21 @ 08:49) (18 - 18)  SpO2: 96% (08-09-21 @ 08:49) (96% - 96%)  Neck: No palpable thyroid nodules.  CVS: S1S2, No murmurs  Respiratory: No wheezing, no crepitations  GI: Abdomen soft, bowel sounds positive  Musculoskeletal:  edema lower extremities.   Skin: No skin rashes, no ecchymosis    Diagnostics             Chief complaint  Patient is a 51y old  Male who presents with a chief complaint of foot ulcer (09 Aug 2021 13:19)   Review of systems  Patient in bed, looks comfortable, no hypoglycemic episodes.    Labs and Fingersticks  CAPILLARY BLOOD GLUCOSE  POCT Blood Glucose.: 284 mg/dL (09 Aug 2021 11:36)  POCT Blood Glucose.: 223 mg/dL (09 Aug 2021 07:47)  POCT Blood Glucose.: 307 mg/dL (08 Aug 2021 21:12)  POCT Blood Glucose.: 249 mg/dL (08 Aug 2021 16:31)                        Medications  MEDICATIONS  (STANDING):  aspirin enteric coated 81 milliGRAM(s) Oral daily  chlorhexidine 2% Cloths 1 Application(s) Topical daily  clotrimazole 1% Cream 1 Application(s) Topical two times a day  dextrose 40% Gel 15 Gram(s) Oral once  dextrose 5%. 1000 milliLiter(s) (50 mL/Hr) IV Continuous <Continuous>  dextrose 5%. 1000 milliLiter(s) (100 mL/Hr) IV Continuous <Continuous>  dextrose 50% Injectable 25 Gram(s) IV Push once  dextrose 50% Injectable 12.5 Gram(s) IV Push once  dextrose 50% Injectable 25 Gram(s) IV Push once  ertapenem  IVPB      ertapenem  IVPB 1000 milliGRAM(s) IV Intermittent every 24 hours  glucagon  Injectable 1 milliGRAM(s) IntraMuscular once  heparin   Injectable 5000 Unit(s) SubCutaneous every 8 hours  insulin glargine Injectable (LANTUS) 50 Unit(s) SubCutaneous at bedtime  insulin lispro (ADMELOG) corrective regimen sliding scale   SubCutaneous at bedtime  insulin lispro (ADMELOG) corrective regimen sliding scale   SubCutaneous three times a day before meals  insulin lispro Injectable (ADMELOG) 20 Unit(s) SubCutaneous three times a day before meals  lisinopril 10 milliGRAM(s) Oral daily  multivitamin 1 Tablet(s) Oral daily  mupirocin 2% Ointment 1 Application(s) Topical two times a day  zinc sulfate 220 milliGRAM(s) Oral daily      Physical Exam  General: Patient comfortable in bed  Vital Signs Last 12 Hrs  T(F): 98 (08-09-21 @ 08:49), Max: 98 (08-09-21 @ 08:49)  HR: 90 (08-09-21 @ 08:49) (90 - 90)  BP: 108/72 (08-09-21 @ 08:49) (108/72 - 108/72)  BP(mean): --  RR: 18 (08-09-21 @ 08:49) (18 - 18)  SpO2: 96% (08-09-21 @ 08:49) (96% - 96%)  Neck: No palpable thyroid nodules.  CVS: S1S2, No murmurs  Respiratory: No wheezing, no crepitations  GI: Abdomen soft, bowel sounds positive  Musculoskeletal:  edema lower extremities.   Skin: No skin rashes, no ecchymosis    Diagnostics

## 2021-08-09 NOTE — DISCHARGE NOTE PROVIDER - NSDCMRMEDTOKEN_GEN_ALL_CORE_FT
Admelog SoloStar 100 units/mL injectable solution: 17 unit(s) injectable 3 times a day  alcohol swabs : Apply topically to affected area 4 times a day   aspirin 81 mg oral tablet: 1 tab(s) orally once a day  atorvastatin 20 mg oral tablet: 1 tab(s) orally once a day  clotrimazole 1% topical cream: 1 application topically 2 times a day  glucometer (per patient&#x27;s insurance): Test blood sugars four times a day. Dispense #1 glucometer.  Insulin Pen Needles, 4mm: 1 application subcutaneously 4 times a day. ** Use with insulin pen **   lancets: 1 application subcutaneously 4 times a day   lisinopril 10 mg oral tablet: 1 tab(s) orally once a day  multivitamin: 1 tab(s) orally once a day  mupirocin 2% topical ointment: 1 application topically 2 times a day  PROzac 10 mg oral tablet: 1 tab(s) orally once a day  Robitussin Mucus + Chest Congestion 100 mg/5 mL oral liquid: 10 milliliter(s) orally every 8 hours as needed for cough  sertraline 50 mg oral tablet: 1 tab(s) orally once a day  Tessalon Perles 100 mg oral capsule: 1 cap(s) orally 3 times a day   test strips (per patient&#x27;s insurance): 1 application subcutaneously 4 times a day. ** Compatible with patient&#x27;s glucometer **  Tylenol 500 mg oral tablet:   vitamin A: 2400 unit(s) orally once a day  Vitamin C 500 mg oral tablet: 1 tab(s) orally once a day  Vitamin D3 125 mcg (5000 intl units) oral tablet: 1 tab(s) orally once a day  Zinc 140 mg (as elemental zinc 50 mg) oral tablet: 1 tab(s) orally once a day   Admelog SoloStar 100 units/mL injectable solution: 17 unit(s) injectable 3 times a day  alcohol swabs : Apply topically to affected area 4 times a day   aspirin 81 mg oral tablet: 1 tab(s) orally once a day  atorvastatin 20 mg oral tablet: 1 tab(s) orally once a day  clotrimazole 1% topical cream: 1 application topically 2 times a day  glucometer (per patient&#x27;s insurance): Test blood sugars four times a day. Dispense #1 glucometer.  Insulin Pen Needles, 4mm: 1 application subcutaneously 4 times a day. ** Use with insulin pen **   lancets: 1 application subcutaneously 4 times a day   linezolid 600 mg oral tablet: 1 tab(s) orally 2 times a day   lisinopril 10 mg oral tablet: 1 tab(s) orally once a day  multivitamin: 1 tab(s) orally once a day  mupirocin 2% topical ointment: 1 application topically 2 times a day  PROzac 10 mg oral tablet: 1 tab(s) orally once a day  Robitussin Mucus + Chest Congestion 100 mg/5 mL oral liquid: 10 milliliter(s) orally every 8 hours as needed for cough  sertraline 50 mg oral tablet: 1 tab(s) orally once a day  Tessalon Perles 100 mg oral capsule: 1 cap(s) orally 3 times a day   test strips (per patient&#x27;s insurance): 1 application subcutaneously 4 times a day. ** Compatible with patient&#x27;s glucometer **  Tylenol 500 mg oral tablet:   vitamin A: 2400 unit(s) orally once a day  Vitamin C 500 mg oral tablet: 1 tab(s) orally once a day  Vitamin D3 125 mcg (5000 intl units) oral tablet: 1 tab(s) orally once a day  Zinc 140 mg (as elemental zinc 50 mg) oral tablet: 1 tab(s) orally once a day   Admelog SoloStar 100 units/mL injectable solution: 17 unit(s) injectable 3 times a day  alcohol swabs : Apply topically to affected area 4 times a day   aspirin 81 mg oral tablet: 1 tab(s) orally once a day  atorvastatin 20 mg oral tablet: 1 tab(s) orally once a day  clotrimazole 1% topical cream: 1 application topically 2 times a day  doxycycline hyclate 100 mg oral capsule: 1 cap(s) orally 2 times a day   glucometer (per patient&#x27;s insurance): Test blood sugars four times a day. Dispense #1 glucometer.  Insulin Pen Needles, 4mm: 1 application subcutaneously 4 times a day. ** Use with insulin pen **   lancets: 1 application subcutaneously 4 times a day   levoFLOXacin 500 mg oral tablet: 1 tab(s) orally once a day   lisinopril 10 mg oral tablet: 1 tab(s) orally once a day  multivitamin: 1 tab(s) orally once a day  mupirocin 2% topical ointment: 1 application topically 2 times a day  PROzac 10 mg oral tablet: 1 tab(s) orally once a day  Robitussin Mucus + Chest Congestion 100 mg/5 mL oral liquid: 10 milliliter(s) orally every 8 hours as needed for cough  sertraline 50 mg oral tablet: 1 tab(s) orally once a day  Tessalon Perles 100 mg oral capsule: 1 cap(s) orally 3 times a day   test strips (per patient&#x27;s insurance): 1 application subcutaneously 4 times a day. ** Compatible with patient&#x27;s glucometer **  Tylenol 500 mg oral tablet:   vitamin A: 2400 unit(s) orally once a day  Vitamin C 500 mg oral tablet: 1 tab(s) orally once a day  Vitamin D3 125 mcg (5000 intl units) oral tablet: 1 tab(s) orally once a day  Zinc 140 mg (as elemental zinc 50 mg) oral tablet: 1 tab(s) orally once a day

## 2021-08-09 NOTE — PROGRESS NOTE ADULT - SUBJECTIVE AND OBJECTIVE BOX
51y old  Male who presents with a chief complaint of foot ulcer (09 Aug 2021 14:27)      Interval history:  Afebrile, some pain in the foot.     Allergies:   penicillin (Hives)  penicillin (Rash)      Antimicrobials:    ertapenem  IVPB 1000 milliGRAM(s) IV Intermittent every 24 hours      REVIEW OF SYSTEMS:  No chest pain   No SOB  No abdominal pain  No rash.       Vital Signs Last 24 Hrs  T(C): 36.4 (08-09-21 @ 16:03), Max: 36.8 (08-08-21 @ 23:52)  T(F): 97.6 (08-09-21 @ 16:03), Max: 98.3 (08-08-21 @ 23:52)  HR: 86 (08-09-21 @ 16:03) (86 - 90)  BP: 117/75 (08-09-21 @ 16:03) (108/72 - 117/75)  BP(mean): --  RR: 18 (08-09-21 @ 16:03) (18 - 18)  SpO2: 100% (08-09-21 @ 16:03) (96% - 100%)      PHYSICAL EXAM:  Patient in no acute distress. AAOX3.  Cardiovascular: S1S2 normal.  Lungs: + air entry B/L lung fields.  Gastrointestinal: soft, nontender, nondistended.  Extremities: Lt foot plantar aspect ulcer, with clean base, granulation base.   IV sites not inflamed.         Culture Results:   Numerous Staphylococcus aureus   Numerous Streptococcus agalactiae (Group B) isolated   Group B streptococci are susceptible to ampicillin,   penicillin and cefazolin, but may be resistant to   erythromycin and clindamycin.   Recommendations for intrapartum prophylaxis for Group B   streptococci are penicillin or ampicillin.   Moderate Mixed Anaerobic [place] "Susceptibilities not performed"   Organism Identification: Staphylococcus aureus   Organism: Staphylococcus aureus   Method Type: RITA     < from: Xray Foot AP + Lateral + Oblique, Left (08.03.21 @ 13:49) >  IMPRESSION: Postoperative changes. No evidence of bony destruction.

## 2021-08-09 NOTE — PROGRESS NOTE ADULT - REASON FOR ADMISSION
foot ulcer

## 2021-08-09 NOTE — PROGRESS NOTE ADULT - ASSESSMENT
Assessment  DMT2: 51y Male with DM T2 with hyperglycemia, admitted with foot wound, A1C 12.6%, was on oral meds and insulin at home, admits noncompliance, now on basal bolus insulin, increased dose yesterday, blood sugars still running high and not at target, no hypoglycemic episode. Patient is eating meals with good appetite, appears alert and comfortable, DC planning in progress.  Foot Ulcer: on medications, stable, monitored.  HTN: Controlled,  on antihypertensive medications.  Obesity: No strict exercise routines, not on any weight loss program, neither on low calorie diet.      Redd Muro MD  Cell: [phone]  Office: [phone]       Assessment  DMT2: 51y Male with DM T2 with hyperglycemia, admitted with foot wound, A1C 12.6%, was on oral meds and insulin at home, admits noncompliance, now on basal bolus insulin, increased dose yesterday, blood sugars still running  high and not at target, no hypoglycemic episode. Patient is eating meals with good appetite, appears alert and comfortable, DC planning in progress.  Foot Ulcer: on medications, stable, monitored.  HTN: Controlled,  on antihypertensive medications.  Obesity: No strict exercise routines, not on any weight loss program, neither on low calorie diet.      Redd Muro MD  Cell: [phone]  Office: [phone]

## 2021-08-09 NOTE — PROGRESS NOTE ADULT - PROBLEM SELECTOR PLAN 2
Suggest to continue medications, monitoring, FU primary team/ID/Podiatry recommendations.

## 2021-08-09 NOTE — PROGRESS NOTE ADULT - NSICDXPILOT_GEN_ALL_CORE
[place]

## 2021-08-09 NOTE — PROGRESS NOTE ADULT - ASSESSMENT
50yo M with left foot submet 2/3 wound to subq  - pt seen and evaluated  - afebrile, no leukocytosis   - left foot submet 2/3 wound to subq, hyperkeratotic periwound, no drainage, no malodor, no fluctuance, no purulence; LLE erythema resolved  - LLE VA Duplex showing no DVT   - LF xray showing no gas, no OM   - follow up as outpatient upon discharged

## 2021-08-09 NOTE — DISCHARGE NOTE PROVIDER - PROVIDER TOKENS
PROVIDER:[TOKEN:[[id number]]],PROVIDER:[TOKEN:[[id number]]] PROVIDER:[TOKEN:[[id number]]],PROVIDER:[TOKEN:[[id number]]],PROVIDER:[TOKEN:[[id number]],FOLLOWUP:[1 week]]

## 2021-08-09 NOTE — PROGRESS NOTE ADULT - ASSESSMENT
50 y/o M MPH HTN HLD DM on insulin, diabetic foot ulcer s/p multiple debridements and L great toe amputation (NUMC) presents to ED c/o worsening redness, pain, and swelling to L foot and ankle x3 days assoc w/ chills 2 days ago. Denies fevers, increased drainage, bleeding from the wound. Denies recent abx.    diabetic foot ulcer  - ID following  - short coarse of  abx as per ID  - esr. crp elevated  - podiatry consult following    phimosis  --No acute urologic intervention   -- treatment w/ clomitrazole 1% twice daily to affected area. Can consider adding on hydrocortisone ointment as needed   --Record strict I/O to ensure pt is voiding   --Can f/u as outpatient for further discussion of phimosis management     htn  - c/w home meds    uncontrolled diabetes  - fs qid  - hgb a1c 12.6  - insulin as per endo  - endo consult following    dvt px  - heparin    d/c planning    time 45 minutes spent

## 2021-08-09 NOTE — DISCHARGE NOTE NURSING/CASE MANAGEMENT/SOCIAL WORK - PATIENT PORTAL LINK FT
You can access the FollowMyHealth Patient Portal offered by [facility] by registering at the following website: [email or web address]. By joining [employer]’s FollowMyHealth portal, you will also be able to view your health information using other applications (apps) compatible with our system.

## 2021-08-09 NOTE — PROGRESS NOTE ADULT - PROBLEM SELECTOR PLAN 3
Suggest to continue medications, monitoring, FU primary team recommendations.

## 2021-08-09 NOTE — DISCHARGE NOTE PROVIDER - CARE PROVIDERS DIRECT ADDRESSES
,DirectAddress_Unknown,[email or web address] ,DirectAddress_Unknown,[email or web address],DirectAddress_Unknown

## 2021-08-09 NOTE — PROGRESS NOTE ADULT - PROBLEM SELECTOR PROBLEM 4
Morbid obesity with BMI of 50.0-59.9, adult

## 2021-08-09 NOTE — PROGRESS NOTE ADULT - PROBLEM SELECTOR PLAN 1
Will increase Lantus to 28u at bedtime.  Will increase Admelog to 10u before each meal and continue Admelog correction scale coverage. Will continue monitoring FS and FU.  Patient counseled for compliance with consistent low carb diet and exercise as tolerated outpatient.
Will increase Lantus to 50u at bedtime.  Will increase Admelog to 20u before each meal and continue Admelog correction scale coverage. Will continue monitoring FS and FU.  Patient with poorly controlled diabetes, A1C 12.6%, admits to noncompliance with insulin injections though reports he is now more motivated to do insulin injections.  -Suggest DC home on current basal bolus insulin regimen, endo FU 4 weeks.  -Needs new glucometer and supplies  Discussed plan with patient. Counseled on importance of glycemic control, compliance with insulin injections, adjusting insulin dose based on blood sugars. Patient counseled for compliance with consistent low carb diet and exercise as tolerated outpatient.
Will increase Lantus to 36u at bedtime.  Will increase Admelog to 14u before each meal and continue Admelog correction scale coverage. Will continue monitoring FS and FU.  Patient counseled for compliance with consistent low carb diet and exercise as tolerated outpatient.
Will increase Lantus to 40u at bedtime.  Will increase Admelog to 17u before each meal and continue Admelog correction scale coverage. Will continue monitoring FS and FU.  Patient with poorly controlled diabetes, A1C 12.6%, admits to noncompliance with insulin injections though reports he is now more motivated to do insulin injections.  -Suggest DC home on current basal bolus insulin regimen, endo FU 4 weeks.  -Needs new glucometer and supplies  Patient counseled for compliance with consistent low carb diet and exercise as tolerated outpatient.

## 2021-08-09 NOTE — DISCHARGE NOTE PROVIDER - CARE PROVIDER_API CALL
Redd Muro)  EndocrinologyMetabDiabetes; Internal Medicine  [address] [place] [address]  Phone: [phone]  Fax: [phone]  Follow Up Time:     Regi Balderas)  Urology  [address] [place] [address]  Phone: [phone]  Fax: [phone]  Follow Up Time:    Redd Muro)  EndocrinologyMetabDiabetes; Internal Medicine  [address] [place] [address]  Phone: [phone]  Fax: [phone]  Follow Up Time:     Regi Balderas)  Urology  [address] [facility], [address] [address]  Phone: [phone]  Fax: [phone]  Follow Up Time:     Helio Schafer (DPM)  Surgery  [address] [facility], [place] [address]  Phone: [phone]  Fax: [phone]  Follow Up Time: 1 week

## 2021-08-09 NOTE — PROGRESS NOTE ADULT - ASSESSMENT
50 y/o M MPH HTN HLD, poorly controlled DM (8/4: Hgb A1C = 12.6) on insulin, morbid obesity (BMI = 50.7) diabetic foot ulcer s/p multiple debridements and L great toe amputation on 1/20 admitted 8/3/21 with painful left plantar ulcer.  Culture positive for staph aureus and grp B strep. PCN allergy but tolerating ertapenem.       Problems  plantar ulcer  poorly controlled DM  morbid obesity    PCN allergy  positive cx finding with MSSA, strep     There is limited role for antibiotics. Local wound care, off loading may help.      Suggest  Ertapenem while inpt   discussed with podiatry, no concern for OM, no OM on XR either.   can switch to linezolid, not covered by insurance,   can switch to doxy and levaquin for 10 days total.   Wound care  glycemic control  HIV screen negative  MRSA PCR swab negative  Podiatry follow up      Plan discussed with Medicine NP    Maribel Peterson  Pager: [phone]. If no response or past 5 pm call [phone].

## 2021-08-09 NOTE — PROGRESS NOTE ADULT - PROVIDER SPECIALTY LIST ADULT
Infectious Disease
Internal Medicine
Internal Medicine
Infectious Disease
Internal Medicine
Podiatry
Endocrinology
Infectious Disease
Internal Medicine
Podiatry
Internal Medicine
Internal Medicine
Endocrinology

## 2021-08-09 NOTE — PROGRESS NOTE ADULT - SUBJECTIVE AND OBJECTIVE BOX
DATE OF SERVICE: 08-09-21 @ 13:21    Patient is a 51y old  Male who presents with a chief complaint of foot ulcer (08 Aug 2021 15:44)      SUBJECTIVE / OVERNIGHT EVENTS:  No chest pain. No shortness of breath. No complaints. No events overnight.     MEDICATIONS  (STANDING):  aspirin enteric coated 81 milliGRAM(s) Oral daily  chlorhexidine 2% Cloths 1 Application(s) Topical daily  clotrimazole 1% Cream 1 Application(s) Topical two times a day  dextrose 40% Gel 15 Gram(s) Oral once  dextrose 5%. 1000 milliLiter(s) (50 mL/Hr) IV Continuous <Continuous>  dextrose 5%. 1000 milliLiter(s) (100 mL/Hr) IV Continuous <Continuous>  dextrose 50% Injectable 25 Gram(s) IV Push once  dextrose 50% Injectable 12.5 Gram(s) IV Push once  dextrose 50% Injectable 25 Gram(s) IV Push once  ertapenem  IVPB      ertapenem  IVPB 1000 milliGRAM(s) IV Intermittent every 24 hours  glucagon  Injectable 1 milliGRAM(s) IntraMuscular once  heparin   Injectable 5000 Unit(s) SubCutaneous every 8 hours  insulin glargine Injectable (LANTUS) 50 Unit(s) SubCutaneous at bedtime  insulin lispro (ADMELOG) corrective regimen sliding scale   SubCutaneous at bedtime  insulin lispro (ADMELOG) corrective regimen sliding scale   SubCutaneous three times a day before meals  insulin lispro Injectable (ADMELOG) 20 Unit(s) SubCutaneous three times a day before meals  lisinopril 10 milliGRAM(s) Oral daily  multivitamin 1 Tablet(s) Oral daily  mupirocin 2% Ointment 1 Application(s) Topical two times a day  zinc sulfate 220 milliGRAM(s) Oral daily    MEDICATIONS  (PRN):      Vital Signs Last 24 Hrs  T(C): 36.7 (09 Aug 2021 08:49), Max: 36.8 (08 Aug 2021 15:59)  T(F): 98 (09 Aug 2021 08:49), Max: 98.3 (08 Aug 2021 15:59)  HR: 90 (09 Aug 2021 08:49) (87 - 90)  BP: 108/72 (09 Aug 2021 08:49) (108/72 - 115/75)  BP(mean): --  RR: 18 (09 Aug 2021 08:49) (18 - 18)  SpO2: 96% (09 Aug 2021 08:49) (96% - 97%)  CAPILLARY BLOOD GLUCOSE      POCT Blood Glucose.: 284 mg/dL (09 Aug 2021 11:36)  POCT Blood Glucose.: 223 mg/dL (09 Aug 2021 07:47)  POCT Blood Glucose.: 307 mg/dL (08 Aug 2021 21:12)  POCT Blood Glucose.: 249 mg/dL (08 Aug 2021 16:31)    I&O's Summary    08 Aug 2021 07:01  -  09 Aug 2021 07:00  --------------------------------------------------------  IN: 430 mL / OUT: 0 mL / NET: 430 mL        PHYSICAL EXAM:  GENERAL: NAD, well-developed  HEAD:  Atraumatic, Normocephalic  EYES: EOMI, PERRLA, conjunctiva and sclera clear  NECK: Supple, No JVD  CHEST/LUNG: Clear to auscultation bilaterally; No wheeze  HEART: Regular rate and rhythm; No murmurs, rubs, or gallops  ABDOMEN: Soft, Nontender, Nondistended; Bowel sounds present  EXTREMITIES:  2+ Peripheral Pulses, No clubbing, cyanosis, or edema  PSYCH: AAOx3  NEUROLOGY: non-focal  SKIN: No rashes or lesions    LABS:                    RADIOLOGY & ADDITIONAL TESTS:    Imaging Personally Reviewed:    Consultant(s) Notes Reviewed:      Care Discussed with Consultants/Other Providers:

## 2021-08-09 NOTE — DISCHARGE NOTE PROVIDER - HOSPITAL COURSE
52 y/o M MPH HTN HLD DM on insulin, diabetic foot ulcer s/p multiple debridements and L great toe amputation (NUMC) presents to ED c/o worsening redness, pain, and swelling to L foot and ankle x3 days assoc w/ chills 2 days ago. Denies fevers, increased drainage, bleeding from the wound. Denies recent abx.    diabetic foot ulcer  ID following  short coarse of  abx as per ID  esr. crp elevated  podiatry consult following    phimosis  No acute urologic intervention    treatment w/ clomitrazole 1% twice daily to affected area. Can consider adding on hydrocortisone ointment as needed   Record strict I/O to ensure pt is voiding   Can f/u as outpatient for further discussion of phimosis management     htn  c/w home meds    uncontrolled diabetes                          DCP with med rec discussed with Dr Reina PT is cleared for [facility] with Home care   Needs a follow up with Dr Muro in 4 weeks 50 y/o M MPH HTN HLD DM on insulin, diabetic foot ulcer s/p multiple debridements and L great toe amputation (NUMC) presents to ED c/o worsening redness, pain, and swelling to L foot and ankle x3 days assoc w/ chills 2 days ago. Denies fevers, increased drainage, bleeding from the wound. Denies recent abx.    diabetic foot ulcer  ID following  short coarse of  abx as per ID  esr. crp elevated  podiatry consult following    phimosis  No acute urologic intervention    treatment w/ clomitrazole 1% twice daily to affected area. Can consider adding on hydrocortisone ointment as needed   Record strict I/O to ensure pt is voiding   Can f/u as outpatient for further discussion of phimosis management   Follow up with Dr Balderas   htn  c/w home meds  uncontrolled diabetes   Endocrine consulted   Follow up with Dr Muro in 4 weeks                           DCP with med rec discussed with Dr Reina PT is cleared for [facility] with Home care   Needs a follow up with Dr Muro in 4 weeks

## 2021-08-09 NOTE — DISCHARGE NOTE PROVIDER - NSDCCPCAREPLAN_GEN_ALL_CORE_FT
PRINCIPAL DISCHARGE DIAGNOSIS  Diagnosis: Cellulitis  Assessment and Plan of Treatment: Take all of your antibiotics as ordered.  Call your Health Care Provider within two days of arriving home to make a follow up appointment within one week.  If the affected cellulitic area increases in redness, warmth, pain or swelling call your Health Care Provider.  If you develop fever, chills, and/or malaise, call your Health Care Provider.   Follow up with PCP      SECONDARY DISCHARGE DIAGNOSES  Diagnosis: Diabetic foot ulcer  Assessment and Plan of Treatment: Take meds as directed

## 2021-08-10 PROBLEM — Z00.00 ENCOUNTER FOR PREVENTIVE HEALTH EXAMINATION: Status: ACTIVE | Noted: 2021-08-10

## 2021-09-01 ENCOUNTER — OUTPATIENT (OUTPATIENT)
Dept: OUTPATIENT SERVICES | Facility: HOSPITAL | Age: 51
LOS: 1 days | End: 2021-09-01
Payer: MEDICAID

## 2021-09-01 DIAGNOSIS — Z89.419 ACQUIRED ABSENCE OF UNSPECIFIED GREAT TOE: Chronic | ICD-10-CM

## 2021-09-14 DIAGNOSIS — Z71.89 OTHER SPECIFIED COUNSELING: ICD-10-CM

## 2021-09-14 PROBLEM — I10 ESSENTIAL (PRIMARY) HYPERTENSION: Chronic | Status: ACTIVE | Noted: 2021-08-03

## 2021-09-14 PROBLEM — E78.5 HYPERLIPIDEMIA, UNSPECIFIED: Chronic | Status: ACTIVE | Noted: 2021-08-03

## 2021-09-14 PROBLEM — E11.9 TYPE 2 DIABETES MELLITUS WITHOUT COMPLICATIONS: Chronic | Status: ACTIVE | Noted: 2021-08-03

## 2021-12-01 PROCEDURE — G9005: CPT

## 2022-05-26 NOTE — CONSULT NOTE ADULT - NSICDXPILOT_GEN_ALL_CORE
[place]
Hemigard Postcare Instructions: The HEMIGARD strips are to remain completely dry for at least 5-7 days.

## 2023-05-01 ENCOUNTER — EMERGENCY (EMERGENCY)
Facility: HOSPITAL | Age: 53
LOS: 1 days | Discharge: HOME | End: 2023-05-01
Attending: EMERGENCY MEDICINE
Payer: COMMERCIAL

## 2023-05-01 VITALS
RESPIRATION RATE: 17 BRPM | SYSTOLIC BLOOD PRESSURE: 122 MMHG | OXYGEN SATURATION: 97 % | DIASTOLIC BLOOD PRESSURE: 78 MMHG | TEMPERATURE: 98 F | HEART RATE: 100 BPM

## 2023-05-01 VITALS
DIASTOLIC BLOOD PRESSURE: 71 MMHG | TEMPERATURE: 99 F | HEART RATE: 116 BPM | RESPIRATION RATE: 18 BRPM | HEIGHT: 66 IN | WEIGHT: 315 LBS | SYSTOLIC BLOOD PRESSURE: 115 MMHG | OXYGEN SATURATION: 96 %

## 2023-05-01 DIAGNOSIS — Z89.419 ACQUIRED ABSENCE OF UNSPECIFIED GREAT TOE: Chronic | ICD-10-CM

## 2023-05-01 LAB
ALBUMIN SERPL ELPH-MCNC: 3.8 G/DL — SIGNIFICANT CHANGE UP (ref 3.3–5)
ALP SERPL-CCNC: 73 U/L — SIGNIFICANT CHANGE UP (ref 40–120)
ALT FLD-CCNC: 21 U/L — SIGNIFICANT CHANGE UP (ref 10–45)
ANION GAP SERPL CALC-SCNC: 8 MMOL/L — SIGNIFICANT CHANGE UP (ref 5–17)
ANION GAP SERPL CALC-SCNC: 9 MMOL/L — SIGNIFICANT CHANGE UP (ref 5–17)
AST SERPL-CCNC: 41 U/L — HIGH (ref 10–40)
BASOPHILS # BLD AUTO: 0.02 K/UL — SIGNIFICANT CHANGE UP (ref 0–0.2)
BASOPHILS NFR BLD AUTO: 0.2 % — SIGNIFICANT CHANGE UP (ref 0–2)
BILIRUB SERPL-MCNC: 0.7 MG/DL — SIGNIFICANT CHANGE UP (ref 0.2–1.2)
BUN SERPL-MCNC: 14 MG/DL — SIGNIFICANT CHANGE UP (ref 7–23)
BUN SERPL-MCNC: 14 MG/DL — SIGNIFICANT CHANGE UP (ref 7–23)
CALCIUM SERPL-MCNC: 9 MG/DL — SIGNIFICANT CHANGE UP (ref 8.4–10.5)
CALCIUM SERPL-MCNC: 9 MG/DL — SIGNIFICANT CHANGE UP (ref 8.4–10.5)
CHLORIDE SERPL-SCNC: 104 MMOL/L — SIGNIFICANT CHANGE UP (ref 96–108)
CHLORIDE SERPL-SCNC: 106 MMOL/L — SIGNIFICANT CHANGE UP (ref 96–108)
CO2 SERPL-SCNC: 25 MMOL/L — SIGNIFICANT CHANGE UP (ref 22–31)
CO2 SERPL-SCNC: 27 MMOL/L — SIGNIFICANT CHANGE UP (ref 22–31)
CREAT SERPL-MCNC: 0.52 MG/DL — SIGNIFICANT CHANGE UP (ref 0.5–1.3)
CREAT SERPL-MCNC: 0.62 MG/DL — SIGNIFICANT CHANGE UP (ref 0.5–1.3)
CRP SERPL-MCNC: 12 MG/L — HIGH (ref 0–4)
EGFR: 116 ML/MIN/1.73M2 — SIGNIFICANT CHANGE UP
EGFR: 122 ML/MIN/1.73M2 — SIGNIFICANT CHANGE UP
EOSINOPHIL # BLD AUTO: 0.3 K/UL — SIGNIFICANT CHANGE UP (ref 0–0.5)
EOSINOPHIL NFR BLD AUTO: 3.7 % — SIGNIFICANT CHANGE UP (ref 0–6)
ERYTHROCYTE [SEDIMENTATION RATE] IN BLOOD: 38 MM/HR — HIGH (ref 0–20)
GLUCOSE SERPL-MCNC: 160 MG/DL — HIGH (ref 70–99)
GLUCOSE SERPL-MCNC: 175 MG/DL — HIGH (ref 70–99)
HCT VFR BLD CALC: 38.2 % — LOW (ref 39–50)
HGB BLD-MCNC: 12.8 G/DL — LOW (ref 13–17)
IMM GRANULOCYTES NFR BLD AUTO: 0.5 % — SIGNIFICANT CHANGE UP (ref 0–0.9)
LYMPHOCYTES # BLD AUTO: 1.76 K/UL — SIGNIFICANT CHANGE UP (ref 1–3.3)
LYMPHOCYTES # BLD AUTO: 21.8 % — SIGNIFICANT CHANGE UP (ref 13–44)
MCHC RBC-ENTMCNC: 27.7 PG — SIGNIFICANT CHANGE UP (ref 27–34)
MCHC RBC-ENTMCNC: 33.5 GM/DL — SIGNIFICANT CHANGE UP (ref 32–36)
MCV RBC AUTO: 82.7 FL — SIGNIFICANT CHANGE UP (ref 80–100)
MONOCYTES # BLD AUTO: 0.42 K/UL — SIGNIFICANT CHANGE UP (ref 0–0.9)
MONOCYTES NFR BLD AUTO: 5.2 % — SIGNIFICANT CHANGE UP (ref 2–14)
NEUTROPHILS # BLD AUTO: 5.54 K/UL — SIGNIFICANT CHANGE UP (ref 1.8–7.4)
NEUTROPHILS NFR BLD AUTO: 68.6 % — SIGNIFICANT CHANGE UP (ref 43–77)
NRBC # BLD: 0 /100 WBCS — SIGNIFICANT CHANGE UP (ref 0–0)
PLATELET # BLD AUTO: 221 K/UL — SIGNIFICANT CHANGE UP (ref 150–400)
POTASSIUM SERPL-MCNC: 4.4 MMOL/L — SIGNIFICANT CHANGE UP (ref 3.5–5.3)
POTASSIUM SERPL-MCNC: 6 MMOL/L — HIGH (ref 3.5–5.3)
POTASSIUM SERPL-SCNC: 4.4 MMOL/L — SIGNIFICANT CHANGE UP (ref 3.5–5.3)
POTASSIUM SERPL-SCNC: 6 MMOL/L — HIGH (ref 3.5–5.3)
PROT SERPL-MCNC: 7.7 G/DL — SIGNIFICANT CHANGE UP (ref 6–8.3)
RBC # BLD: 4.62 M/UL — SIGNIFICANT CHANGE UP (ref 4.2–5.8)
RBC # FLD: 14.3 % — SIGNIFICANT CHANGE UP (ref 10.3–14.5)
SODIUM SERPL-SCNC: 138 MMOL/L — SIGNIFICANT CHANGE UP (ref 135–145)
SODIUM SERPL-SCNC: 141 MMOL/L — SIGNIFICANT CHANGE UP (ref 135–145)
WBC # BLD: 8.08 K/UL — SIGNIFICANT CHANGE UP (ref 3.8–10.5)
WBC # FLD AUTO: 8.08 K/UL — SIGNIFICANT CHANGE UP (ref 3.8–10.5)

## 2023-05-01 PROCEDURE — 87389 HIV-1 AG W/HIV-1&-2 AB AG IA: CPT

## 2023-05-01 PROCEDURE — 82962 GLUCOSE BLOOD TEST: CPT

## 2023-05-01 PROCEDURE — 73590 X-RAY EXAM OF LOWER LEG: CPT

## 2023-05-01 PROCEDURE — 99285 EMERGENCY DEPT VISIT HI MDM: CPT

## 2023-05-01 PROCEDURE — 36415 COLL VENOUS BLD VENIPUNCTURE: CPT

## 2023-05-01 PROCEDURE — 86703 HIV-1/HIV-2 1 RESULT ANTBDY: CPT

## 2023-05-01 PROCEDURE — 99214 OFFICE O/P EST MOD 30 MIN: CPT

## 2023-05-01 PROCEDURE — 80053 COMPREHEN METABOLIC PANEL: CPT

## 2023-05-01 PROCEDURE — 86140 C-REACTIVE PROTEIN: CPT

## 2023-05-01 PROCEDURE — 99284 EMERGENCY DEPT VISIT MOD MDM: CPT | Mod: 25

## 2023-05-01 PROCEDURE — 73630 X-RAY EXAM OF FOOT: CPT | Mod: 26,LT

## 2023-05-01 PROCEDURE — 80048 BASIC METABOLIC PNL TOTAL CA: CPT

## 2023-05-01 PROCEDURE — 73590 X-RAY EXAM OF LOWER LEG: CPT | Mod: 26,LT,RT

## 2023-05-01 PROCEDURE — 85652 RBC SED RATE AUTOMATED: CPT

## 2023-05-01 PROCEDURE — 85025 COMPLETE CBC W/AUTO DIFF WBC: CPT

## 2023-05-01 PROCEDURE — 73630 X-RAY EXAM OF FOOT: CPT

## 2023-05-01 RX ADMIN — Medication 100 MILLIGRAM(S): at 15:22

## 2023-05-01 NOTE — ED PROVIDER NOTE - CLINICAL SUMMARY MEDICAL DECISION MAKING FREE TEXT BOX
52-year-old male past medical history HTN HLD DM chronic diabetic foot ulcer status post multiple debridements  chronic lower extremity wounds presents with persistence of lower extremity wounds. VSS. Diffuse healing rash with excoriations on upper extremities and thighs.  Bilateral lower extremities on shin and calf chronic wounds minimal bleeding  clear discharge no purulent drainage no warmth.  Left foot with chronic wound healing no signs of infection.   Concern for chronic nonhealing wounds.  Low concern for infectious etiology cellulitis versus abscess given no drainage warmth erythema or systemic symptoms.  Will get labs and x-ray to evaluate for infection.  Will consult wound care for chronic wounds. 52-year-old male past medical history HTN HLD DM chronic diabetic foot ulcer status post multiple debridements  chronic lower extremity wounds presents with persistence of lower extremity wounds. VSS. Diffuse healing rash with excoriations on upper extremities and thighs.  Bilateral lower extremities on shin and calf chronic wounds minimal bleeding  clear discharge no purulent drainage no warmth.  Left foot with chronic wound healing no signs of infection.   Concern for chronic nonhealing wounds.  Low concern for infectious etiology cellulitis versus abscess given no drainage warmth erythema or systemic symptoms.  Will get labs and x-ray to evaluate for infection.  Will consult wound care for chronic wounds.  --BMM

## 2023-05-01 NOTE — ED PROVIDER NOTE - NSFOLLOWUPCLINICS_GEN_ALL_ED_FT
[facility]  Podiatry  [address] [place] [address] - [address] [place] [address]  Phone: [phone]  Fax:

## 2023-05-01 NOTE — ED PROVIDER NOTE - NS ED ATTENDING STATEMENT MOD
Follow-up with podiatrist Dr. Michaela Dunn for evaluation of bilateral foot pain in 1 to 2 days. Call for an appointment. Follow-up with primary care physician in 1 to 2 days for reevaluation of foot pain. Call for an appointment  Take prednisone as prescribed for inflammation pain and swelling  Rest ice elevation to the feet  Return to the emergency department immediately increased pain or swelling numbness and tingling weakness in extremities inability to ambulate or worsening symptoms. Attending with

## 2023-05-01 NOTE — ED PROCEDURE NOTE - PROCEDURE ADDITIONAL DETAILS
Emergency Department Focused Ultrasound performed at patient's bedside for placement of ultrasound guided IV. The study was confirmed with blood return and ease of flushing saline.    Upper extremity laterality: left

## 2023-05-01 NOTE — CONSULT NOTE ADULT - ASSESSMENT
51yo M with left foot submet 2/3 wound to subq  - pt seen and evaluated  - afebrile, no leukocytosis   - left foot submet 2/3 wound to subq, hyperkeratotic periwound, no drainage, no malodor, no fluctuance, no purulence  - LF xray showing no gas, no OM   - left foot wound stable, continue local wound care   - follow up with outpatient wound care doctor upon discharge 
A/P: 53yo M w/ PMH/o HTN, DM, HLD, s/p Lt great toe amp and Lt plantar foot ulcer    Wound Consult requested to assist w/ management of  BLE PVD w/ stasis dermatitis  BLE wounds  Lt plantar ulcer      BLE Aquacel Ag dressing QD w/ Compression       Cleanse w/ NS + Pat dry      Moisturize intact skin w/ Sween cream     Cover wounds w/ AQUACEL Ag + ABD pad      Secure w/ cling roll gauze      ACE WRAP from base of toes to knee in figure of 8 pattern       Daily  BLE elevation & Compression  Consider Xray Lt foot  Consider Podiatry consult  Can have venous reflux studies as outpt in wound center  Abx per ED  Moisturize intact skin w/ SWEEN cream BID  Nutritional optimization         encourage high quality protein, nereyda/ prosource, MVI & Vit C to promote wound healing  Hyperglycemia - ADA diet and PO meds w/ FS w/ ISS  Continue turning and positioning w/ offloading assistive devices as per protocol  Buttocks/ Sacrum Continue w/ attends under pads and Pericare w/ care as per protocol  Waffle Cushion to chair when oob to chair  Continue w/ low air loss pressure redistribution bed surface   Care as per medicine, will follow w/ you  Upon discharge f/u as outpatient at Wound Center [address] [facility] [phone]  D/w team & RN & attng  Thank you for this consult  Michelle Romano PA-C CWS [id number]  Nights/ Weekends/ Holidays please call:  General Surgery Consult pager ([phone]) for emergencies  Wound PT for multilayer leg wrapping or VAC issues (x [phone])   I spent 55 minutes face to face w/ this pt of which more than 50% of the time was spent counseling & coordinating care of this pt.

## 2023-05-01 NOTE — ED ADULT NURSE NOTE - OBJECTIVE STATEMENT
51 y/o male presenting to the ER c/o B/L LE pain. Pt reports 1 month of B/L LE pain and wound, pt has Hx of chronic wounds nonhealing, pt has L. ulcer on plantar surface of L. foot, pt had noticed "black" coloring to B/L LE which then started to "dry and scab causing the skin to fall off" and underneath was bright red skin, pt came to ER for further eval. Pt presents to [facility] A&Ox3, pt endorsing B/L LE numbness and burning worsening x 1 month, has boot Rx'd from wound care MD who pt follows w/ outpt for nonhealing wounds and for ulcer of L. foot, pain's 8/10 in nature and pain minimally resolved w/ OTC meds at home, pt wife at bedside endorsing pt's diet isn't good and not "eating the right foods", pt also endorsing generalized rash x 1 month as well which was seen by PCP and being followed w/ outpt. PMHx DM, HTN, HLD, L. foot great toe amputation. Pt denies chest pain, SOB/difficulty breathing, fever/chills, HA, abd pain, N/V/D, lightheadedness/dizziness, tingling. Pt A&Ox3, breathing spontaneous and unlabored, bed locked and in lowest position.

## 2023-05-01 NOTE — ED PROVIDER NOTE - PROGRESS NOTE DETAILS
Laura CUNNINGHAM pgy2: Labs and imaging negative for emergent pathology at this time.  Patient seen by wound care and podiatry cleared by their services.  Social work set up patient for wound care follow-up at home as well as appointments with podiatry outpatient.  Safe for discharge with strict return precautions given.

## 2023-05-01 NOTE — CONSULT NOTE ADULT - SUBJECTIVE AND OBJECTIVE BOX
Wound SURGERY CONSULT NOTE    HPI:        N/V/D,  BM/ Flatus,   NGT,     palp/ sob/dyspnea/ cp,       F/C/S  Wound consult requested by team to assist w/ management of      wound/ pressure injury.   Pt (unable to)  c/o pain, drainage, odor, color change,  or worsening swelling. Offloading and pericare initiated upon admission as pt Increasingly sedentary 2/2 to illness. Pt is Incontinent of urine & stool. (+)glasgow/ ostomy.   No h/o bites, scratches, falls, trauma.  Pt seen by Wound RN  CAVILON Advance/  Acacia,TRIAD/ Radhaell/ medihoney/ Allevyn foam/ dakins/ Adaptic/ DSD recommended used at home/ while awaiting consult.  Appetite good/ decreased.  weight loss.  S&S / RD consult appreciated All questions asked and answered to pt's and family's expressed understanding and satisfaction.    Current Diet:     PAST MEDICAL & SURGICAL HISTORY:  HTN (hypertension)      HLD (hyperlipidemia)      DM (diabetes mellitus)      Great toe amputee          REVIEW OF SYSTEMS: Pt unable to offer  General/ Breast/ Skin/Vasc/ Neuro/ MSK: see HPI  All other systems negative    MEDICATIONS  (STANDING):    MEDICATIONS  (PRN):      Allergies    penicillin (Rash)  penicillin (Hives)    Intolerances        SOCIAL HISTORY:  / /single/ ; (+)HHA/ lives in SNF; Former smoker, No current/ Denies smoking, ETOH, drugs    FAMILY HISTORY:   no h/o PVD or wound healing or skin/ significant problems    PHYSICAL EXAM:  Vital Signs Last 24 Hrs  T(C): 37.1 (01 May 2023 10:19), Max: 37.1 (01 May 2023 10:19)  T(F): 98.8 (01 May 2023 10:19), Max: 98.8 (01 May 2023 10:19)  HR: 116 (01 May 2023 10:19) (116 - 116)  BP: 115/71 (01 May 2023 10:19) (115/71 - 115/71)  BP(mean): --  RR: 18 (01 May 2023 10:19) (18 - 18)  SpO2: 96% (01 May 2023 10:19) (96% - 96%)    Parameters below as of 01 May 2023 10:19  Patient On (Oxygen Delivery Method): room air        NAD, Guarded but stable,  A&Ox3/ Alert/ Confused  cachectic/ thin, MO/ Obese, frail,  WD/ WN/ WG,  Disheveled  Total Care Sport/ Versa Care P500 / Envella Progressa bed     HEENT:  NC/AT, PERRL, EOMI, sclera clear, mucosa moist, throat clear, trachea midline, neck supple, trach  Respiratory: nonlabored w/ equal chest rise  Gastrointestinal: soft NT/ND (+)BS  (+)PEG (+)ostomy (+)NGT  : (+)glasgow/ purewick/ condom cath  Neurology:  weakened strength & sensation grossly intact, paraesthesia  nonverbal, no follow commands, paraplegic  Psych: calm/ appropriate/ flat affect/ easily agitated/ restless/ anxious/ difficult to assess  Musculoskeletal:  limited stiff / p/FROM, no deformities/ contractures  Vascular: BLE equally warm/ cool,  no cyanosis, clubbing, edema nor acute ischemia           >LE //BLE edema equal           BLE DP/PT pulses palpable          BLE hemosiderin staining/ varicose veins  Skin:  moist w/ good turgor  thin, dry, pale, frail,  ecchymosis w/o hematoma  blistering  or serosanguinous drainage  No odor, erythema, increased warmth, tenderness, induration, fluctuance, nor crepitus    LABS/ CULTURES/ RADIOLOGY:      138  |  104  |  14  ----------------------------<  175      [05-01-23 @ 12:59]  6.0   |  25  |  0.52        Ca     9.0     [05-01-23 @ 12:59]    TPro  7.7  /  Alb  3.8  /  TBili  0.7  /  DBili  x   /  AST  41  /  ALT  21  /  AlkPhos  73  [05-01-23 @ 12:59]                                  A/P:    Wound Consult requested to assist w/ management of    BLE elevation & Compression  Consider JESÚS/PVR, Duplex, Xray, A/P BLE CT or MRI  Abx per Medicine/ ID  Moisturize intact skin w/ SWEEN cream BID  Nutrition Consult for optimization in pt w/ Severe Protein Calorie Malnutrition,        Inadequate PO intake, & Increased nutritional needs            encourage high quality protein, nereyda/ prosource, MVI & Vit C to promote wound healing  Hyperglycemia - improving w/ ADA diet and Lantus/ NPH & FS w/ ISS, consider Endo Consult, consider HgA1c  Anemia- improving w/ transfusions, Fe studies, protonix  Continue turning and positioning w/ offloading assistive devices as per protocol  Buttocks/ Sacrum Acacia/ TRIAD BID /CAVILON ADVANCE TIW and prn soiling        Continue w/ attends under pads and Pericare w/ glasgow/ condom cath maintenance / purewick care as per protocol  Waffle Cushion to chair when oob to chair  Continue w/ low air loss pressure redistribution bed surface   Pt will need Group 2 mattress on hospital bed and ROHO cushion for wheel chair upon discharge home  Care as per medicine, will follow w/ you/ remain available as requested  Upon discharge f/u as outpatient at Wound Center [address] [place] [address] [phone]  Seen w/ attng & RN and D/w team & RN  Thank you for this consult  Michelle Romano PA-C CWS [id number]  Nights/ Weekends/ Holidays please call:  General Surgery Consult pager ([phone]) for emergencies  Wound PT for multilayer leg wrapping or VAC issues (x [phone])      Wound SURGERY CONSULT NOTE    HPI:  52-year-old male past medical history HTN HLD DM chronic diabetic foot ulcer status post multiple debridements  chronic lower extremity wounds presents with persistence of lower extremity wounds.  Patient reports over the last 2 weeks the wounds have had weeping clear drainage minimal blood no purulent drainage.  No fevers or chills.  Minimal pain associated with drainage.  No purulent drainage warmth swelling redness of chronic wound on foot.  Patient has seen PCP for wounds most recently 2 weeks ago with no interventions.  Patient also reporting diffuse body rash for weeks that has been improving.  Patient has not tried any medications for pain.  Has been applying iodine to area.  Patient sees wound care for wounds.  No weakness no loss of smell loss of sensation.  Patient is able to ambulate.    Wound consult requested by team to assist w/ management of BLE wounds.  Pt c/o iodine burns and still having drainage but no odor, color change,  BLE  swelling unchanged.  doesn't really wear compression,  but wears a boot- this boot is better than older one.  foot wound ' healing'.   No h/o bites, scratches, falls, trauma.   Appetite good  w/o weight lossAll questions asked and answered to pt's and family's expressed understanding and satisfaction.      PAST MEDICAL & SURGICAL HISTORY:  HTN (hypertension)      HLD (hyperlipidemia)      DM (diabetes mellitus)      s/p Lt Great toe amputee      REVIEW OF SYSTEMS: General/ Skin/Vasc/ MSK: see HPI  All other systems negative      Allergies  penicillin (Rash/ Hives)    SOCIAL HISTORY:  ;  Denies smoking, ETOH, drugs    FAMILY HISTORY: no h/o significant problems    PHYSICAL EXAM:  Vital Signs Last 24 Hrs  T(C): 37.1 (01 May 2023 10:19), Max: 37.1 (01 May 2023 10:19)  T(F): 98.8 (01 May 2023 10:19), Max: 98.8 (01 May 2023 10:19)  HR: 116 (01 May 2023 10:19) (116 - 116)  BP: 115/71 (01 May 2023 10:19) (115/71 - 115/71)  BP(mean): --  RR: 18 (01 May 2023 10:19) (18 - 18)  SpO2: 96% (01 May 2023 10:19) (96% - 96%)    Parameters below as of 01 May 2023 10:19  Patient On (Oxygen Delivery Method): room air      NAD, A&Ox3, Obese  WD/ WN/ WG  HEENT:  NC/AT, EOMI, sclera clear, mucosa moist, throat clear, trachea midline, neck supple  Respiratory: nonlabored w/ equal chest rise  Gastrointestinal: soft NT/ND   Neurology:  strength & BLE paraesthesia  Psych: calm/ appropriate  Musculoskeletal:  FROM, S/p Lt toe amp  Vascular: BLE equally warm,  no cyanosis, clubbing, nor acute ischemia         BLE edema equal         (+)BLE DP pulses palpable         BLE hemosiderin staining w/ dry flakey scale              denuded skin w/ green serosanguinous drainage on dressings         Lt plantar MTP ulcer moist granular w/ serosanguinous drainage  No odor, erythema, increased warmth, tenderness, induration, fluctuance, nor crepitus  Skin: dry, frail,  flakey skin      LABS/ CULTURES/ RADIOLOGY:      138  |  104  |  14  ----------------------------<  175      [05-01-23 @ 12:59]  6.0   |  25  |  0.52        Ca     9.0     [05-01-23 @ 12:59]    TPro  7.7  /  Alb  3.8  /  TBili  0.7  /  DBili  x   /  AST  41  /  ALT  21  /  AlkPhos  73  [05-01-23 @ 12:59]

## 2023-05-01 NOTE — ED PROVIDER NOTE - PHYSICAL EXAMINATION
Gen: NAD, non-toxic appearing  Head: normal appearing  HEENT: normal conjunctiva, oral mucosa moist  Lung: no respiratory distress, speaking in full sentences, CTA b/l     CV: regular rate and rhythm, no murmurs  Abd: soft, non distended, non tender   MSK: no visible deformities  Neuro: No focal deficits, AAOx3  Skin:   Diffuse healing rash with excoriations on upper extremities and thighs.  Bilateral lower extremities on shin and calf chronic wounds minimal bleeding  clear discharge no purulent drainage no warmth.  Left foot with chronic wound healing no signs of infection.  Psych: normal affect Gen: NAD, non-toxic appearing  Head: normal appearing  HEENT: normal conjunctiva, oral mucosa moist  Lung: no respiratory distress, speaking in full sentences, CTA b/l     CV: regular rate and rhythm, no murmurs  Abd: soft, non distended, non tender   MSK: no visible deformities  Neuro: No focal deficits, AAOx3  Skin:   Diffuse healing rash with excoriations on upper extremities and thighs.  Bilateral lower extremities on shin and calf chronic wounds minimal bleeding  clear discharge no purulent drainage no warmth.  Left foot with chronic wound healing no signs of infection.  B/L LE wounds appear c/w chronic venous stasis ulcers; L plant quarter sized ulceration noted as well, no discharge, tenderness to palpation, or erythema.  Psych: normal affect

## 2023-05-01 NOTE — ED PROVIDER NOTE - NSFOLLOWUPINSTRUCTIONS_ED_ALL_ED_FT
- Please   Follow-up with podiatry please follow-up with wound care.   Wound Center [address] [facility] [phone]  - Be sure to return to the ED if you develop new or worsening symptoms. Specific signs and symptoms to be vigilant of: fever or chills, chest pain, difficulty breathing, palpitations, loss of consciousness, headache, vision changes, slurred speech, difficulty swallowing or drooling, facial droop, weakness in the arms or legs, numbness or tingling, abdominal pain, nausea or vomiting, diarrhea, constipation, blood in the stool or urine, pain on urination, difficulty urinating.

## 2023-05-01 NOTE — CONSULT NOTE ADULT - SUBJECTIVE AND OBJECTIVE BOX
52-year-old male past medical history HTN HLD DM chronic diabetic foot ulcer status post multiple debridements  chronic lower extremity wounds presents with persistence of lower extremity wounds.  Patient reports over the last 2 weeks the wounds have had weeping clear drainage minimal blood no purulent drainage.  No fevers or chills.  Minimal pain associated with drainage.  No purulent drainage warmth swelling redness of chronic wound on foot.  Patient has seen PCP for wounds most recently 2 weeks ago with no interventions.  Patient also reporting diffuse body rash for weeks that has been improving.  Patient has not tried any medications for pain.  Has been applying iodine to area.  Patient sees wound care for wounds.  No weakness no loss of smell loss of sensation.  Patient is able to ambulate    PAST MEDICAL & SURGICAL HISTORY:  HTN (hypertension)      HLD (hyperlipidemia)      DM (diabetes mellitus)      Great toe amputee          MEDICATIONS  (STANDING):    MEDICATIONS  (PRN):      Allergies    penicillin (Rash)  penicillin (Hives)    Intolerances        VITALS:    Vital Signs Last 24 Hrs  T(C): 37.1 (01 May 2023 10:19), Max: 37.1 (01 May 2023 10:19)  T(F): 98.8 (01 May 2023 10:19), Max: 98.8 (01 May 2023 10:19)  HR: 116 (01 May 2023 10:19) (116 - 116)  BP: 115/71 (01 May 2023 10:19) (115/71 - 115/71)  BP(mean): --  RR: 18 (01 May 2023 10:19) (18 - 18)  SpO2: 96% (01 May 2023 10:19) (96% - 96%)    Parameters below as of 01 May 2023 10:19  Patient On (Oxygen Delivery Method): room air        LABS:                          12.8   8.08  )-----------( 221      ( 01 May 2023 14:26 )             38.2       05-01    141  |  106  |  14  ----------------------------<  160<H>  4.4   |  27  |  0.62    Ca    9.0      01 May 2023 14:25    TPro  7.7  /  Alb  3.8  /  TBili  0.7  /  DBili  x   /  AST  41<H>  /  ALT  21  /  AlkPhos  73  05-01      CAPILLARY BLOOD GLUCOSE      POCT Blood Glucose.: 192 mg/dL (01 May 2023 10:22)          LOWER EXTREMITY PHYSICAL EXAM:    Vascular: DP/PT 0/4 B/L, CFT <3 seconds B/L, Temperature gradient warm to cool B/L. pitting edema bilat lower extremity  Neuro: Epicritic sensation absent  to the level of forefoot b/l   Musculoskeletal/Ortho: s/p left foot hallux amputation   Skin: left foot submet 2/3 wound to subq, hyperkeratotic periwound, no drainage, no malodor, no fluctuance, no purulence; bilat venous stasis ulcerations lower extremities bilat      RADIOLOGY  < from: Xray Foot AP + Lateral + Oblique, Left (05.01.23 @ 15:17) >    ACC: [id number] EXAM:  XR FOOT COMP MIN 3 VIEWS LT   ORDERED BY: AVEL SHARMA     PROCEDURE DATE:  05/01/2023          INTERPRETATION:  EXAM TYPE: XR FOOT COMPLETE 3 VIEWS LEFT  EXAM DATE AND TIME: 5/1/2023  INDICATION: r/o osteo  COMPARISON: None.    TECHNIQUE: 3 views left foot    IMPRESSION: Status post amputation distal to the left first metatarsal   with sharp cortical margins. There is no soft tissue ulcer extending to   bone. Mild to moderate osteoarthritis of the midfoot. Soft tissue   swelling of the foot    --- End of Report ---            ARSLAN MCLEAN DO; Attending Radiologist    < end of copied text >

## 2023-05-01 NOTE — ED PROVIDER NOTE - OBJECTIVE STATEMENT
52-year-old male past medical history HTN HLD DM chronic diabetic foot ulcer status post multiple debridements  chronic lower extremity wounds presents with persistence of lower extremity wounds.  Patient reports over the last 2 weeks the wounds have had weeping clear drainage minimal blood no purulent drainage.  No fevers or chills.  Minimal pain associated with drainage.  No purulent drainage warmth swelling redness of chronic wound on foot.  Patient has seen PCP for wounds most recently 2 weeks ago with no interventions.  Patient also reporting diffuse body rash for weeks that has been improving.  Patient has not tried any medications for pain.  Has been applying iodine to area.  Patient sees wound care for wounds.  No weakness no loss of smell loss of sensation.  Patient is able to ambulate.

## 2023-05-01 NOTE — ED PROVIDER NOTE - PATIENT PORTAL LINK FT
You can access the FollowMyHealth Patient Portal offered by [facility] by registering at the following website: [email or web address]. By joining [email or web address]’s FollowMyHealth portal, you will also be able to view your health information using other applications (apps) compatible with our system.

## 2023-05-02 LAB — HIV 1+2 AB+HIV1 P24 AG SERPL QL IA: SIGNIFICANT CHANGE UP

## 2023-05-02 NOTE — ED POST DISCHARGE NOTE - RESULT SUMMARY
ESR 38 in setting of chronic foot wound getting followed by podiatry. No OM/gas on xray. Result would not change previously recommended management. - KATIE CubaC

## 2023-05-24 NOTE — ED PROVIDER NOTE - CARE PLAN
Patient came in for EKG visit to see if he was out of rhythm after his recent CV because he has been SOB the last 2 days.     EKG showed NSR, HR 60. Patient has an ablation scheduled on 5/26/23. Any further recommendations?   
Principal Discharge DX:	CAP (community acquired pneumonia)

## 2023-11-20 NOTE — PROGRESS NOTE ADULT - ASSESSMENT
Assessment  DMT2: 51y Male with DM T2 with hyperglycemia, admitted with foot wound, A1C 12.6%, was on oral meds and insulin at home, likely noncompliant, now on basal bolus insulin, increased dose yesterday, blood sugars still running high and not at  target, no hypoglycemic episode, eating meals.  Foot Ulcer: on medications, stable, monitored.  HTN: Controlled,  on antihypertensive medications.  Obesity: No strict exercise routines, not on any weight loss program, neither on low calorie diet.      Redd Muro MD  Cell: [phone]  Office: [phone]     Family has been updated.